# Patient Record
Sex: MALE | Race: WHITE | Employment: OTHER | URBAN - NONMETROPOLITAN AREA
[De-identification: names, ages, dates, MRNs, and addresses within clinical notes are randomized per-mention and may not be internally consistent; named-entity substitution may affect disease eponyms.]

---

## 2017-02-03 ENCOUNTER — OFFICE VISIT (OUTPATIENT)
Dept: UROLOGY | Age: 62
End: 2017-02-03

## 2017-02-03 VITALS
DIASTOLIC BLOOD PRESSURE: 80 MMHG | SYSTOLIC BLOOD PRESSURE: 112 MMHG | HEIGHT: 72 IN | BODY MASS INDEX: 24.79 KG/M2 | WEIGHT: 183 LBS

## 2017-02-03 DIAGNOSIS — N48.89 PENILE PAIN: Primary | ICD-10-CM

## 2017-02-03 DIAGNOSIS — R35.0 URINARY FREQUENCY: ICD-10-CM

## 2017-02-03 DIAGNOSIS — N41.0 ACUTE PROSTATITIS: ICD-10-CM

## 2017-02-03 LAB
BILIRUBIN URINE: NEGATIVE
BLOOD URINE, POC: NEGATIVE
CHARACTER, URINE: CLEAR
COLOR, URINE: YELLOW
GLUCOSE URINE: NEGATIVE MG/DL
KETONES, URINE: NEGATIVE
LEUKOCYTE CLUMPS, URINE: NEGATIVE
NITRITE, URINE: NEGATIVE
PH, URINE: 6
POST VOID RESIDUAL (PVR): 22 ML
PROTEIN, URINE: NEGATIVE MG/DL
SPECIFIC GRAVITY, URINE: 1.01 (ref 1–1.03)
UROBILINOGEN, URINE: 0.2 EU/DL

## 2017-02-03 PROCEDURE — 99203 OFFICE O/P NEW LOW 30 MIN: CPT | Performed by: NURSE PRACTITIONER

## 2017-02-03 PROCEDURE — 51798 US URINE CAPACITY MEASURE: CPT | Performed by: NURSE PRACTITIONER

## 2017-02-03 PROCEDURE — 81003 URINALYSIS AUTO W/O SCOPE: CPT | Performed by: NURSE PRACTITIONER

## 2017-02-03 RX ORDER — PHENAZOPYRIDINE HYDROCHLORIDE 200 MG/1
200 TABLET, FILM COATED ORAL 3 TIMES DAILY PRN
COMMUNITY
End: 2017-05-16 | Stop reason: ALTCHOICE

## 2017-02-03 RX ORDER — CIPROFLOXACIN 500 MG/1
500 TABLET, FILM COATED ORAL 2 TIMES DAILY
Qty: 50 TABLET | Refills: 0 | Status: SHIPPED | OUTPATIENT
Start: 2017-02-03 | End: 2017-02-28

## 2017-02-03 RX ORDER — ASPIRIN 325 MG
325 TABLET ORAL DAILY
COMMUNITY
End: 2017-05-16

## 2017-05-16 ENCOUNTER — OFFICE VISIT (OUTPATIENT)
Age: 62
End: 2017-05-16

## 2017-05-16 ENCOUNTER — TELEPHONE (OUTPATIENT)
Age: 62
End: 2017-05-16

## 2017-05-16 VITALS
RESPIRATION RATE: 16 BRPM | TEMPERATURE: 97 F | SYSTOLIC BLOOD PRESSURE: 110 MMHG | BODY MASS INDEX: 25.06 KG/M2 | DIASTOLIC BLOOD PRESSURE: 64 MMHG | HEIGHT: 72 IN | HEART RATE: 74 BPM | OXYGEN SATURATION: 96 % | WEIGHT: 185 LBS

## 2017-05-16 DIAGNOSIS — R10.30 LOWER ABDOMINAL PAIN: ICD-10-CM

## 2017-05-16 DIAGNOSIS — K21.9 GASTROESOPHAGEAL REFLUX DISEASE, ESOPHAGITIS PRESENCE NOT SPECIFIED: ICD-10-CM

## 2017-05-16 DIAGNOSIS — R19.4 CHANGE IN BOWEL HABIT: Primary | ICD-10-CM

## 2017-05-16 PROCEDURE — 99214 OFFICE O/P EST MOD 30 MIN: CPT | Performed by: SURGERY

## 2017-05-16 RX ORDER — ALPRAZOLAM 1 MG/1
0.5 TABLET ORAL NIGHTLY PRN
Qty: 30 TABLET | Refills: 0 | Status: SHIPPED | OUTPATIENT
Start: 2017-05-16 | End: 2017-08-01 | Stop reason: SDUPTHER

## 2017-05-16 RX ORDER — HYDROCODONE BITARTRATE AND ACETAMINOPHEN 5; 325 MG/1; MG/1
1 TABLET ORAL EVERY 6 HOURS PRN
Qty: 10 TABLET | Refills: 0 | Status: SHIPPED | OUTPATIENT
Start: 2017-05-16 | End: 2017-08-01 | Stop reason: SDUPTHER

## 2017-05-16 ASSESSMENT — ENCOUNTER SYMPTOMS
CHOKING: 0
VOICE CHANGE: 0
SORE THROAT: 0
EYE DISCHARGE: 0
RHINORRHEA: 0
DIARRHEA: 1
SHORTNESS OF BREATH: 0
CONSTIPATION: 1
COUGH: 0
BACK PAIN: 0
ANAL BLEEDING: 0
ABDOMINAL DISTENTION: 0
TROUBLE SWALLOWING: 0
BLOOD IN STOOL: 0
COLOR CHANGE: 0
EYE PAIN: 0
SINUS PRESSURE: 0
WHEEZING: 0
FACIAL SWELLING: 0
RECTAL PAIN: 0
EYE REDNESS: 0
PHOTOPHOBIA: 0
CHEST TIGHTNESS: 0
APNEA: 0
NAUSEA: 0
STRIDOR: 0
EYE ITCHING: 0
VOMITING: 0
ABDOMINAL PAIN: 0

## 2017-06-07 ENCOUNTER — TELEPHONE (OUTPATIENT)
Age: 62
End: 2017-06-07

## 2017-06-14 ENCOUNTER — TELEPHONE (OUTPATIENT)
Age: 62
End: 2017-06-14

## 2017-06-27 ENCOUNTER — TELEPHONE (OUTPATIENT)
Age: 62
End: 2017-06-27

## 2017-07-20 ENCOUNTER — HOSPITAL ENCOUNTER (OUTPATIENT)
Age: 62
Setting detail: OUTPATIENT SURGERY
Discharge: HOME | End: 2017-07-20
Attending: SURGERY | Admitting: SURGERY

## 2017-07-20 VITALS
BODY MASS INDEX: 23.7 KG/M2 | SYSTOLIC BLOOD PRESSURE: 118 MMHG | DIASTOLIC BLOOD PRESSURE: 80 MMHG | OXYGEN SATURATION: 97 % | TEMPERATURE: 97.2 F | HEIGHT: 72 IN | HEART RATE: 51 BPM | WEIGHT: 175 LBS | RESPIRATION RATE: 18 BRPM

## 2017-07-20 PROCEDURE — 3609017100 HC EGD: Performed by: SURGERY

## 2017-07-20 PROCEDURE — 43239 EGD BIOPSY SINGLE/MULTIPLE: CPT | Performed by: SURGERY

## 2017-07-20 PROCEDURE — 6360000002 HC RX W HCPCS

## 2017-07-20 PROCEDURE — 3609027000 HC COLONOSCOPY: Performed by: SURGERY

## 2017-07-20 PROCEDURE — 2580000003 HC RX 258: Performed by: SURGERY

## 2017-07-20 PROCEDURE — 7100000001 HC PACU RECOVERY - ADDTL 15 MIN: Performed by: SURGERY

## 2017-07-20 PROCEDURE — 7100000000 HC PACU RECOVERY - FIRST 15 MIN: Performed by: SURGERY

## 2017-07-20 PROCEDURE — 45378 DIAGNOSTIC COLONOSCOPY: CPT | Performed by: SURGERY

## 2017-07-20 PROCEDURE — 6360000002 HC RX W HCPCS: Performed by: SURGERY

## 2017-07-20 RX ORDER — SODIUM CHLORIDE 450 MG/100ML
INJECTION, SOLUTION INTRAVENOUS CONTINUOUS
Status: DISCONTINUED | OUTPATIENT
Start: 2017-07-20 | End: 2017-07-20 | Stop reason: HOSPADM

## 2017-07-20 RX ORDER — MIDAZOLAM HYDROCHLORIDE 1 MG/ML
INJECTION INTRAMUSCULAR; INTRAVENOUS PRN
Status: DISCONTINUED | OUTPATIENT
Start: 2017-07-20 | End: 2017-07-20 | Stop reason: HOSPADM

## 2017-07-20 RX ORDER — FENTANYL CITRATE 50 UG/ML
INJECTION, SOLUTION INTRAMUSCULAR; INTRAVENOUS PRN
Status: DISCONTINUED | OUTPATIENT
Start: 2017-07-20 | End: 2017-07-20 | Stop reason: HOSPADM

## 2017-07-20 RX ADMIN — SODIUM CHLORIDE: 4.5 INJECTION, SOLUTION INTRAVENOUS at 07:54

## 2017-07-20 ASSESSMENT — PAIN SCALES - GENERAL
PAINLEVEL_OUTOF10: 0
PAINLEVEL_OUTOF10: 0

## 2017-07-20 ASSESSMENT — PAIN - FUNCTIONAL ASSESSMENT: PAIN_FUNCTIONAL_ASSESSMENT: 0-10

## 2017-07-24 ENCOUNTER — TELEPHONE (OUTPATIENT)
Dept: SURGERY | Age: 62
End: 2017-07-24

## 2017-08-01 ENCOUNTER — OFFICE VISIT (OUTPATIENT)
Dept: SURGERY | Age: 62
End: 2017-08-01

## 2017-08-01 VITALS
TEMPERATURE: 97.2 F | SYSTOLIC BLOOD PRESSURE: 112 MMHG | DIASTOLIC BLOOD PRESSURE: 68 MMHG | OXYGEN SATURATION: 96 % | HEIGHT: 72 IN | WEIGHT: 178.7 LBS | RESPIRATION RATE: 18 BRPM | BODY MASS INDEX: 24.2 KG/M2 | HEART RATE: 90 BPM

## 2017-08-01 DIAGNOSIS — Z98.890 S/P COLONOSCOPY: ICD-10-CM

## 2017-08-01 DIAGNOSIS — Z98.890 S/P ENDOSCOPY: Primary | ICD-10-CM

## 2017-08-01 PROCEDURE — 99212 OFFICE O/P EST SF 10 MIN: CPT | Performed by: SURGERY

## 2017-08-01 ASSESSMENT — ENCOUNTER SYMPTOMS
ABDOMINAL DISTENTION: 0
ANAL BLEEDING: 0
ABDOMINAL PAIN: 0
SINUS PRESSURE: 0
DIARRHEA: 0
APNEA: 0
COLOR CHANGE: 0
TROUBLE SWALLOWING: 0
COUGH: 0
PHOTOPHOBIA: 0
CONSTIPATION: 1
BLOOD IN STOOL: 0
EYE DISCHARGE: 0
EYE ITCHING: 0
BACK PAIN: 0
SORE THROAT: 0
RHINORRHEA: 0
SHORTNESS OF BREATH: 0
EYE REDNESS: 0
RECTAL PAIN: 0
VOMITING: 0
FACIAL SWELLING: 0
CHOKING: 0
EYE PAIN: 0
STRIDOR: 0
VOICE CHANGE: 0
WHEEZING: 0
CHEST TIGHTNESS: 0
NAUSEA: 0

## 2017-08-01 NOTE — PROGRESS NOTES
Neurological: Negative for dizziness, tremors, seizures, syncope, facial asymmetry, speech difficulty, weakness, light-headedness, numbness and headaches. Hematological: Negative for adenopathy. Does not bruise/bleed easily. Psychiatric/Behavioral: Negative for agitation, behavioral problems, confusion, decreased concentration, dysphoric mood, hallucinations, self-injury, sleep disturbance and suicidal ideas. The patient is not nervous/anxious and is not hyperactive. Blood pressure 112/68, pulse 90, temperature 97.2 °F (36.2 °C), temperature source Tympanic, resp. rate 18, height 6' 0.05\" (1.83 m), weight 178 lb 11.2 oz (81.1 kg), SpO2 96 %. Body mass index is 24.2 kg/(m^2). Past Medical History:   Diagnosis Date    CKD (chronic kidney disease) stage 3, GFR 30-59 ml/min 12/23/2015    GERD (gastroesophageal reflux disease)     Hypertension     IBS (irritable bowel syndrome)      Past Surgical History:   Procedure Laterality Date    ARM SURGERY Left 1998    CARDIOVASCULAR STRESS TEST  2011    COLONOSCOPY  over 20 years ago    [place] COL[place] SCRN NOT  [address] IND N/A 7/20/2017    COLONOSCOPY performed by [address], MD at [address] [facility]    [place] COL[place] SCRN [place]  [address] IND Left 7/20/2017    COLONOSCOPY performed by [address], MD at [address] [facility]    [place] ESOPHAGOGASTRODUODENOSCOPY TRANSORAL DIAGNOSTIC N/A 7/20/2017    EGD  performed by [address], MD at [address] [facility]     Social History     Social History    Marital status:      Spouse name: N/A    Number of children: N/A    Years of education: N/A     Occupational History    Not on file.      Social History Main Topics    Smoking status: Former Smoker     Packs/day: 3.00     Years: 11.00     Quit date: 11/11/1973    Smokeless tobacco: Never Used    Alcohol use No    Drug use: No    Sexual activity: Not on file     Other Topics Concern    Not on file     Social History Narrative     Family History   Problem Relation Age of Onset    Heart Disease Mother     Cancer Paternal Aunt      colon     Allergies   Allergen Reactions    Iv Contrast [Iodides] Other (See Comments)     Pt states \"increase heart rate\"       Current Outpatient Prescriptions:     Probiotic Product (PROBIOTIC DAILY PO), Take 1 capsule by mouth daily, Disp: , Rfl:     doxazosin (CARDURA) 2 MG tablet, TAKE ONE TABLET BY MOUTH ONCE DAILY, Disp: 30 tablet, Rfl: 5    HYDROcodone-acetaminophen (NORCO) 5-325 MG per tablet, Take 1 tablet by mouth every 6 hours as needed for Pain ., Disp: 15 tablet, Rfl: 0    ALPRAZolam (XANAX) 0.5 MG tablet, Take 0.5 tablets by mouth 3 times daily as needed for Anxiety, Disp: 45 tablet, Rfl: 2    esomeprazole (NEXIUM) 20 MG delayed release capsule, Take 20 mg by mouth every morning (before breakfast), Disp: , Rfl:     gabapentin (NEURONTIN) 100 MG capsule, 3 in am; 3 in afternoon; 2 at bedtime, Disp: 240 capsule, Rfl: 5    triamcinolone (ARISTOCORT) 0.5 % cream, Apply topically 3 times daily, Disp: 1 Tube, Rfl: 2    finasteride (PROSCAR) 5 MG tablet, Take 5 mg by mouth daily, Disp: , Rfl:     hydrocortisone valerate (WESTCORT) 0.2 % ointment, Apply topically 2 times daily PRN, Disp: 1 Tube, Rfl: 5    Objective:   Physical Exam abdomen is soft bowel sounds positive    Assessment:      Discussed endoscopies with the patient including the normal colonoscopy we did discuss the fact that he has diverticular disease and we discussed diverticulosis scuffs to the current recommendation that no real dietary modification easily done just because of the diverticular disease we did discuss the hiatal hernia but the lack of esophagitis      Plan:      Return to office for repeat colonoscopy in 10 years and sooner if symptoms would ensue continue his Nexium

## 2017-08-06 ENCOUNTER — APPOINTMENT (OUTPATIENT)
Dept: GENERAL RADIOLOGY | Age: 62
End: 2017-08-06

## 2017-08-06 ENCOUNTER — HOSPITAL ENCOUNTER (EMERGENCY)
Age: 62
Discharge: HOME | End: 2017-08-07
Attending: EMERGENCY MEDICINE

## 2017-08-06 VITALS
TEMPERATURE: 97.7 F | WEIGHT: 175 LBS | HEART RATE: 57 BPM | BODY MASS INDEX: 23.7 KG/M2 | DIASTOLIC BLOOD PRESSURE: 73 MMHG | OXYGEN SATURATION: 100 % | SYSTOLIC BLOOD PRESSURE: 106 MMHG | RESPIRATION RATE: 16 BRPM | HEIGHT: 72 IN

## 2017-08-06 DIAGNOSIS — I49.3 VENTRICULAR ECTOPY: ICD-10-CM

## 2017-08-06 DIAGNOSIS — R00.2 PALPITATIONS: Primary | ICD-10-CM

## 2017-08-06 LAB
ALBUMIN SERPL-MCNC: 4.3 G/DL (ref 3.5–5.1)
ALP BLD-CCNC: 96 U/L (ref 38–126)
ALT SERPL-CCNC: 19 U/L (ref 11–66)
ANION GAP SERPL CALCULATED.3IONS-SCNC: 11 MEQ/L (ref 8–16)
AST SERPL-CCNC: 17 U/L (ref 5–40)
BASOPHILS # BLD: 0.7 %
BASOPHILS ABSOLUTE: 0 THOU/MM3 (ref 0–0.1)
BILIRUB SERPL-MCNC: 0.2 MG/DL (ref 0.3–1.2)
BUN BLDV-MCNC: 16 MG/DL (ref 7–22)
CALCIUM SERPL-MCNC: 9.2 MG/DL (ref 8.5–10.5)
CHLORIDE BLD-SCNC: 99 MEQ/L (ref 98–111)
CO2: 29 MEQ/L (ref 23–33)
CREAT SERPL-MCNC: 1.5 MG/DL (ref 0.4–1.2)
EOSINOPHIL # BLD: 4.2 %
EOSINOPHILS ABSOLUTE: 0.2 THOU/MM3 (ref 0–0.4)
GFR SERPL CREATININE-BSD FRML MDRD: 47 ML/MIN/1.73M2
GLUCOSE BLD-MCNC: 126 MG/DL (ref 70–108)
HCT VFR BLD CALC: 44.6 % (ref 42–52)
HEMOGLOBIN: 15.1 GM/DL (ref 14–18)
LYMPHOCYTES # BLD: 40.5 %
LYMPHOCYTES ABSOLUTE: 2.1 THOU/MM3 (ref 1–4.8)
MAGNESIUM: 2.2 MG/DL (ref 1.6–2.4)
MCH RBC QN AUTO: 30.9 PG (ref 27–31)
MCHC RBC AUTO-ENTMCNC: 33.8 GM/DL (ref 33–37)
MCV RBC AUTO: 91.3 FL (ref 80–94)
MONOCYTES # BLD: 9.7 %
MONOCYTES ABSOLUTE: 0.5 THOU/MM3 (ref 0.4–1.3)
NUCLEATED RED BLOOD CELLS: 0 /100 WBC
OSMOLALITY CALCULATION: 280.3 MOSMOL/KG (ref 275–300)
PDW BLD-RTO: 13.3 % (ref 11.5–14.5)
PLATELET # BLD: 218 THOU/MM3 (ref 130–400)
PMV BLD AUTO: 9.1 MCM (ref 7.4–10.4)
POTASSIUM SERPL-SCNC: 4.2 MEQ/L (ref 3.5–5.2)
RBC # BLD: 4.88 MILL/MM3 (ref 4.7–6.1)
RBC # BLD: NORMAL 10*6/UL
SEG NEUTROPHILS: 44.9 %
SEGMENTED NEUTROPHILS ABSOLUTE COUNT: 2.4 THOU/MM3 (ref 1.8–7.7)
SODIUM BLD-SCNC: 139 MEQ/L (ref 135–145)
TOTAL PROTEIN: 6.9 G/DL (ref 6.1–8)
TROPONIN T: < 0.01 NG/ML
TSH SERPL DL<=0.05 MIU/L-ACNC: 3.09 UIU/ML (ref 0.4–4.2)
WBC # BLD: 5.3 THOU/MM3 (ref 4.8–10.8)

## 2017-08-06 PROCEDURE — 80050 GENERAL HEALTH PANEL: CPT

## 2017-08-06 PROCEDURE — 36415 COLL VENOUS BLD VENIPUNCTURE: CPT

## 2017-08-06 PROCEDURE — 93005 ELECTROCARDIOGRAM TRACING: CPT

## 2017-08-06 PROCEDURE — 83735 ASSAY OF MAGNESIUM: CPT

## 2017-08-06 PROCEDURE — 71010 XR CHEST PORTABLE: CPT

## 2017-08-06 PROCEDURE — 84484 ASSAY OF TROPONIN QUANT: CPT

## 2017-08-06 PROCEDURE — 99285 EMERGENCY DEPT VISIT HI MDM: CPT

## 2017-08-06 ASSESSMENT — PAIN DESCRIPTION - LOCATION: LOCATION: HEAD

## 2017-08-06 ASSESSMENT — PAIN DESCRIPTION - PAIN TYPE: TYPE: ACUTE PAIN

## 2017-08-06 ASSESSMENT — ENCOUNTER SYMPTOMS
VOMITING: 0
BACK PAIN: 0
ABDOMINAL PAIN: 0
WHEEZING: 0
BLOOD IN STOOL: 0
DIARRHEA: 0
RHINORRHEA: 0
SHORTNESS OF BREATH: 0
NAUSEA: 0
COUGH: 0
SORE THROAT: 0

## 2017-08-06 ASSESSMENT — PAIN SCALES - GENERAL: PAINLEVEL_OUTOF10: 2

## 2017-08-06 ASSESSMENT — PAIN DESCRIPTION - ONSET: ONSET: GRADUAL

## 2017-08-06 ASSESSMENT — PAIN DESCRIPTION - DESCRIPTORS: DESCRIPTORS: ACHING

## 2017-08-07 LAB
EKG ATRIAL RATE: 61 BPM
EKG P AXIS: 35 DEGREES
EKG P-R INTERVAL: 150 MS
EKG Q-T INTERVAL: 406 MS
EKG QRS DURATION: 84 MS
EKG QTC CALCULATION (BAZETT): 408 MS
EKG R AXIS: 60 DEGREES
EKG T AXIS: 34 DEGREES
EKG VENTRICULAR RATE: 61 BPM

## 2017-08-23 DIAGNOSIS — R19.4 CHANGE IN BOWEL HABIT: ICD-10-CM

## 2017-08-23 DIAGNOSIS — R10.30 LOWER ABDOMINAL PAIN: ICD-10-CM

## 2017-08-23 DIAGNOSIS — K21.9 GASTROESOPHAGEAL REFLUX DISEASE, ESOPHAGITIS PRESENCE NOT SPECIFIED: ICD-10-CM

## 2017-08-24 ENCOUNTER — HOSPITAL ENCOUNTER (EMERGENCY)
Age: 62
Discharge: HOME | End: 2017-08-24
Attending: EMERGENCY MEDICINE

## 2017-08-24 ENCOUNTER — APPOINTMENT (OUTPATIENT)
Dept: GENERAL RADIOLOGY | Age: 62
End: 2017-08-24

## 2017-08-24 VITALS
HEIGHT: 72 IN | TEMPERATURE: 97.9 F | HEART RATE: 53 BPM | WEIGHT: 175 LBS | RESPIRATION RATE: 16 BRPM | OXYGEN SATURATION: 100 % | DIASTOLIC BLOOD PRESSURE: 72 MMHG | BODY MASS INDEX: 23.7 KG/M2 | SYSTOLIC BLOOD PRESSURE: 110 MMHG

## 2017-08-24 DIAGNOSIS — R00.1 BRADYCARDIA: Primary | ICD-10-CM

## 2017-08-24 DIAGNOSIS — R00.2 PALPITATIONS: ICD-10-CM

## 2017-08-24 LAB
ALBUMIN SERPL-MCNC: 4.1 G/DL (ref 3.5–5.1)
ALP BLD-CCNC: 82 U/L (ref 38–126)
ALT SERPL-CCNC: 13 U/L (ref 11–66)
ANION GAP SERPL CALCULATED.3IONS-SCNC: 11 MEQ/L (ref 8–16)
AST SERPL-CCNC: 13 U/L (ref 5–40)
BASOPHILS # BLD: 0.8 %
BASOPHILS ABSOLUTE: 0 THOU/MM3 (ref 0–0.1)
BILIRUB SERPL-MCNC: 0.4 MG/DL (ref 0.3–1.2)
BILIRUBIN DIRECT: < 0.2 MG/DL (ref 0–0.3)
BUN BLDV-MCNC: 17 MG/DL (ref 7–22)
CALCIUM SERPL-MCNC: 9.5 MG/DL (ref 8.5–10.5)
CHLORIDE BLD-SCNC: 102 MEQ/L (ref 98–111)
CO2: 29 MEQ/L (ref 23–33)
CREAT SERPL-MCNC: 1.4 MG/DL (ref 0.4–1.2)
EKG ATRIAL RATE: 48 BPM
EKG P AXIS: 42 DEGREES
EKG P-R INTERVAL: 154 MS
EKG Q-T INTERVAL: 420 MS
EKG QRS DURATION: 86 MS
EKG QTC CALCULATION (BAZETT): 375 MS
EKG R AXIS: 60 DEGREES
EKG T AXIS: 54 DEGREES
EKG VENTRICULAR RATE: 48 BPM
EOSINOPHIL # BLD: 3.8 %
EOSINOPHILS ABSOLUTE: 0.2 THOU/MM3 (ref 0–0.4)
GFR SERPL CREATININE-BSD FRML MDRD: 51 ML/MIN/1.73M2
GLUCOSE BLD-MCNC: 95 MG/DL (ref 70–108)
HCT VFR BLD CALC: 48 % (ref 42–52)
HEMOGLOBIN: 16.6 GM/DL (ref 14–18)
LIPASE: 56.6 U/L (ref 5.6–51.3)
LYMPHOCYTES # BLD: 41.1 %
LYMPHOCYTES ABSOLUTE: 2.3 THOU/MM3 (ref 1–4.8)
MAGNESIUM: 2.4 MG/DL (ref 1.6–2.4)
MCH RBC QN AUTO: 31.7 PG (ref 27–31)
MCHC RBC AUTO-ENTMCNC: 34.6 GM/DL (ref 33–37)
MCV RBC AUTO: 91.7 FL (ref 80–94)
MONOCYTES # BLD: 8.2 %
MONOCYTES ABSOLUTE: 0.5 THOU/MM3 (ref 0.4–1.3)
NUCLEATED RED BLOOD CELLS: 0 /100 WBC
OSMOLALITY CALCULATION: 284.5 MOSMOL/KG (ref 275–300)
PDW BLD-RTO: 12.8 % (ref 11.5–14.5)
PLATELET # BLD: 225 THOU/MM3 (ref 130–400)
PMV BLD AUTO: 9.6 MCM (ref 7.4–10.4)
POTASSIUM SERPL-SCNC: 4.3 MEQ/L (ref 3.5–5.2)
PRO-BNP: 82.4 PG/ML (ref 0–900)
RBC # BLD: 5.24 MILL/MM3 (ref 4.7–6.1)
RBC # BLD: NORMAL 10*6/UL
SEG NEUTROPHILS: 46.1 %
SEGMENTED NEUTROPHILS ABSOLUTE COUNT: 2.5 THOU/MM3 (ref 1.8–7.7)
SODIUM BLD-SCNC: 142 MEQ/L (ref 135–145)
TOTAL PROTEIN: 6.4 G/DL (ref 6.1–8)
TROPONIN T: < 0.01 NG/ML
WBC # BLD: 5.5 THOU/MM3 (ref 4.8–10.8)

## 2017-08-24 PROCEDURE — 80053 COMPREHEN METABOLIC PANEL: CPT

## 2017-08-24 PROCEDURE — 99285 EMERGENCY DEPT VISIT HI MDM: CPT

## 2017-08-24 PROCEDURE — 83690 ASSAY OF LIPASE: CPT

## 2017-08-24 PROCEDURE — 82248 BILIRUBIN DIRECT: CPT

## 2017-08-24 PROCEDURE — 93005 ELECTROCARDIOGRAM TRACING: CPT

## 2017-08-24 PROCEDURE — 36415 COLL VENOUS BLD VENIPUNCTURE: CPT

## 2017-08-24 PROCEDURE — 85025 COMPLETE CBC W/AUTO DIFF WBC: CPT

## 2017-08-24 PROCEDURE — 83880 ASSAY OF NATRIURETIC PEPTIDE: CPT

## 2017-08-24 PROCEDURE — 83735 ASSAY OF MAGNESIUM: CPT

## 2017-08-24 PROCEDURE — 84484 ASSAY OF TROPONIN QUANT: CPT

## 2017-08-24 PROCEDURE — 71020 XR CHEST STANDARD TWO VW: CPT

## 2017-08-24 ASSESSMENT — ENCOUNTER SYMPTOMS
CONSTIPATION: 0
SORE THROAT: 0
VOMITING: 0
COUGH: 0
CHEST TIGHTNESS: 0
SINUS PRESSURE: 0
EYE DISCHARGE: 0
SHORTNESS OF BREATH: 0
CHOKING: 0
PHOTOPHOBIA: 0
TROUBLE SWALLOWING: 0
RHINORRHEA: 0
BACK PAIN: 0
WHEEZING: 0
ABDOMINAL PAIN: 0
VOICE CHANGE: 0
EYE REDNESS: 0
EYE PAIN: 0
ABDOMINAL DISTENTION: 0
NAUSEA: 0
BLOOD IN STOOL: 0
DIARRHEA: 0
EYE ITCHING: 0

## 2017-09-22 ENCOUNTER — HOSPITAL ENCOUNTER (EMERGENCY)
Age: 62
Discharge: LEFT BEFORE DISCHARGE | End: 2017-09-22

## 2017-09-22 VITALS
WEIGHT: 175 LBS | TEMPERATURE: 98.5 F | HEART RATE: 60 BPM | BODY MASS INDEX: 23.7 KG/M2 | HEIGHT: 72 IN | DIASTOLIC BLOOD PRESSURE: 65 MMHG | RESPIRATION RATE: 16 BRPM | SYSTOLIC BLOOD PRESSURE: 102 MMHG | OXYGEN SATURATION: 96 %

## 2017-09-22 DIAGNOSIS — R00.2 PALPITATIONS: Primary | ICD-10-CM

## 2017-09-22 PROCEDURE — 93005 ELECTROCARDIOGRAM TRACING: CPT | Performed by: STUDENT IN AN ORGANIZED HEALTH CARE EDUCATION/TRAINING PROGRAM

## 2017-09-22 PROCEDURE — 99285 EMERGENCY DEPT VISIT HI MDM: CPT

## 2017-09-24 LAB
EKG ATRIAL RATE: 57 BPM
EKG P AXIS: 43 DEGREES
EKG P-R INTERVAL: 146 MS
EKG Q-T INTERVAL: 404 MS
EKG QRS DURATION: 86 MS
EKG QTC CALCULATION (BAZETT): 393 MS
EKG R AXIS: 64 DEGREES
EKG T AXIS: 56 DEGREES
EKG VENTRICULAR RATE: 57 BPM

## 2017-10-25 ENCOUNTER — HOSPITAL ENCOUNTER (EMERGENCY)
Age: 62
Discharge: HOME | End: 2017-10-25
Attending: EMERGENCY MEDICINE

## 2017-10-25 ENCOUNTER — APPOINTMENT (OUTPATIENT)
Dept: GENERAL RADIOLOGY | Age: 62
End: 2017-10-25

## 2017-10-25 VITALS
WEIGHT: 175 LBS | SYSTOLIC BLOOD PRESSURE: 113 MMHG | TEMPERATURE: 97.9 F | OXYGEN SATURATION: 98 % | RESPIRATION RATE: 16 BRPM | HEIGHT: 72 IN | BODY MASS INDEX: 23.7 KG/M2 | HEART RATE: 51 BPM | DIASTOLIC BLOOD PRESSURE: 78 MMHG

## 2017-10-25 DIAGNOSIS — R00.2 PALPITATIONS: Primary | ICD-10-CM

## 2017-10-25 DIAGNOSIS — I49.3 PVC (PREMATURE VENTRICULAR CONTRACTION): ICD-10-CM

## 2017-10-25 LAB
ANION GAP SERPL CALCULATED.3IONS-SCNC: 11 MEQ/L (ref 8–16)
BASOPHILS # BLD: 0.9 %
BASOPHILS ABSOLUTE: 0.1 THOU/MM3 (ref 0–0.1)
BUN BLDV-MCNC: 23 MG/DL (ref 7–22)
CALCIUM SERPL-MCNC: 9.5 MG/DL (ref 8.5–10.5)
CHLORIDE BLD-SCNC: 102 MEQ/L (ref 98–111)
CO2: 30 MEQ/L (ref 23–33)
CREAT SERPL-MCNC: 1.6 MG/DL (ref 0.4–1.2)
EOSINOPHIL # BLD: 3.8 %
EOSINOPHILS ABSOLUTE: 0.2 THOU/MM3 (ref 0–0.4)
GFR SERPL CREATININE-BSD FRML MDRD: 44 ML/MIN/1.73M2
GLUCOSE BLD-MCNC: 115 MG/DL (ref 70–108)
HCT VFR BLD CALC: 48.3 % (ref 42–52)
HEMOGLOBIN: 16.5 GM/DL (ref 14–18)
LYMPHOCYTES # BLD: 36.9 %
LYMPHOCYTES ABSOLUTE: 2.2 THOU/MM3 (ref 1–4.8)
MCH RBC QN AUTO: 31.4 PG (ref 27–31)
MCHC RBC AUTO-ENTMCNC: 34.1 GM/DL (ref 33–37)
MCV RBC AUTO: 92.2 FL (ref 80–94)
MONOCYTES # BLD: 10.4 %
MONOCYTES ABSOLUTE: 0.6 THOU/MM3 (ref 0.4–1.3)
NUCLEATED RED BLOOD CELLS: 0 /100 WBC
OSMOLALITY CALCULATION: 289.6 MOSMOL/KG (ref 275–300)
PDW BLD-RTO: 12.9 % (ref 11.5–14.5)
PLATELET # BLD: 223 THOU/MM3 (ref 130–400)
PMV BLD AUTO: 9 MCM (ref 7.4–10.4)
POTASSIUM SERPL-SCNC: 4.8 MEQ/L (ref 3.5–5.2)
RBC # BLD: 5.24 MILL/MM3 (ref 4.7–6.1)
SEG NEUTROPHILS: 48 %
SEGMENTED NEUTROPHILS ABSOLUTE COUNT: 2.9 THOU/MM3 (ref 1.8–7.7)
SODIUM BLD-SCNC: 143 MEQ/L (ref 135–145)
WBC # BLD: 6 THOU/MM3 (ref 4.8–10.8)

## 2017-10-25 PROCEDURE — 6370000000 HC RX 637 (ALT 250 FOR IP)

## 2017-10-25 PROCEDURE — 85025 COMPLETE CBC W/AUTO DIFF WBC: CPT

## 2017-10-25 PROCEDURE — 93005 ELECTROCARDIOGRAM TRACING: CPT

## 2017-10-25 PROCEDURE — 36415 COLL VENOUS BLD VENIPUNCTURE: CPT

## 2017-10-25 PROCEDURE — 99285 EMERGENCY DEPT VISIT HI MDM: CPT

## 2017-10-25 PROCEDURE — 80048 BASIC METABOLIC PNL TOTAL CA: CPT

## 2017-10-25 PROCEDURE — 71020 XR CHEST STANDARD TWO VW: CPT

## 2017-10-25 RX ORDER — PROPRANOLOL HYDROCHLORIDE 10 MG/1
10 TABLET ORAL 2 TIMES DAILY
COMMUNITY

## 2017-10-25 RX ORDER — THIAMINE HYDROCHLORIDE 100 MG/ML
100 INJECTION, SOLUTION INTRAMUSCULAR; INTRAVENOUS DAILY
Status: DISCONTINUED | OUTPATIENT
Start: 2017-10-25 | End: 2017-10-25 | Stop reason: HOSPADM

## 2017-10-25 RX ORDER — THIAMINE MONONITRATE (VIT B1) 100 MG
TABLET ORAL
Status: COMPLETED
Start: 2017-10-25 | End: 2017-10-25

## 2017-10-25 RX ADMIN — Medication 100 MG: at 21:13

## 2017-10-25 NOTE — ED TRIAGE NOTES
Pt to ED with c/o palpitations and sob at times. States palpitations have been present for 2 months and pt has been put on propanolol and midrodrine by cardiologist. Pt does not take midodrine due to side effects. On arrival no respiratory distress noted. States he has been having episodes of dizziness for 3 weeks when sitting and standing at times. Denies dizziness on arrival. Denies pain.

## 2017-10-25 NOTE — ED NOTES
Pt resting quietly in room no needs expressed. Side rails up x2 with call light in reach. Will continue to monitor. Updated pt on poc  Pt informed that all ordered tests have resulted.        Nilam Rodriguez RN  10/25/17 6780

## 2017-10-26 LAB
EKG ATRIAL RATE: 55 BPM
EKG P AXIS: 26 DEGREES
EKG P-R INTERVAL: 156 MS
EKG Q-T INTERVAL: 394 MS
EKG QRS DURATION: 86 MS
EKG QTC CALCULATION (BAZETT): 376 MS
EKG R AXIS: 47 DEGREES
EKG T AXIS: 45 DEGREES
EKG VENTRICULAR RATE: 55 BPM

## 2017-10-27 NOTE — ED PROVIDER NOTES
status of his paternal aunt is unknown.    family history includes Cancer in his paternal aunt; Heart Disease in his mother. SOCIAL HISTORY      reports that he quit smoking about 43 years ago. He has a 33.00 pack-year smoking history. He has never used smokeless tobacco. He reports that he does not drink alcohol or use drugs. PHYSICAL EXAM     INITIAL VITALS:  height is 6' (1.829 m) and weight is 175 lb (79.4 kg). His oral temperature is 97.9 °F (36.6 °C). His blood pressure is 113/78 and his pulse is 51. His respiration is 16 and oxygen saturation is 98%. Physical Exam   Constitutional:  well-developed and well-nourished. HENT: Head: Normocephalic, atraumatic, Bilateral external ears normal, Oropharynx mosit, No oral exudates, Nose normal.   Eyes: PERRL, EOMI, Conjunctiva normal, No discharge. No scleral icterus  Neck: Normal range of motion, No tenderness, Supple  Lympatics: No lymphadenopathy. Cardiovascular: Low rate, regular rhythm, S1 normal and S2 normal.  Exam reveals no gallop. Pulmonary/Chest: Effort normal and breath sounds normal. No accessory muscle usage or stridor. No respiratory distress. no wheezes. has no rales. exhibits no tenderness. Abdominal: Soft. Bowel sounds are normal.  exhibits no distension. There is no tenderness. There is no rebound and no guarding. Extremities: No edema, no tenderness, no cyanosis, no clubbing. Musculoskeletal: Good range of motion in major joints is observed. No major deformities noted. Neurological: Alert and oriented ×3, normal motor function, normal sensory function, no focal deficits. GCS   Skin: Skin is warm, dry and intact. No rash noted. No erythema.    Psychiatric: Affect normal, judgment normal, mood normal.  DIFFERENTIAL DIAGNOSIS:       DIAGNOSTIC RESULTS     EKG: All EKG's are interpreted by the Emergency Department Physician who either signs or Co-signs this chart in the absence of a cardiologist.      RADIOLOGY: non-plain film images(s) such as CT, Ultrasound and MRI are read by the radiologist.  Plain radiographic images are visualized and preliminarily interpreted by the emergency physician unless otherwise stated below. LABS:   Labs Reviewed   CBC WITH AUTO DIFFERENTIAL - Abnormal; Notable for the following:        Result Value    MCH 31.4 (*)     All other components within normal limits   BASIC METABOLIC PANEL - Abnormal; Notable for the following:     Glucose 115 (*)     BUN 23 (*)     CREATININE 1.6 (*)     All other components within normal limits   GLOMERULAR FILTRATION RATE, ESTIMATED - Abnormal; Notable for the following:     Est, Glom Filt Rate 44 (*)     All other components within normal limits   ANION GAP   OSMOLALITY       EMERGENCY DEPARTMENT COURSE:   Vitals:    Vitals:    10/25/17 1743 10/1955   BP: (!) 137/90 113/78   Pulse: 58 51   Resp: 16 16   Temp: 97.9 °F (36.6 °C)    TempSrc: Oral    SpO2: 100% 98%   Weight:  175 lb (79.4 kg)   Height:  6' (1.829 m)         CRITICAL CARE:       CONSULTS:  None    PROCEDURES:  None    FINAL IMPRESSION      1. Palpitations    2. PVC (premature ventricular contraction)          DISPOSITION/PLAN   Decision To Discharge  Patient has chronic palpitations and bradycardia. Occasional PVCs noted on his EKG. Case discussed with Dr. Sabrina Gilliland group, wants patient to reduce Inderal to once a day. Patient is also instructed to follow-up with cardiology tomorrow morning. This was discussed with patient, his agreeable to outpatient follow-up for his symptoms.   PATIENT REFERRED TO:  Ally Mark DO  [address] [facility], [address]  [address] [facility] [phone]    Call on 10/26/2017  RE-CHECK AND FURTHER TESTING AS NEEDED      DISCHARGE MEDICATIONS:  Discharge Medication List as of 10/25/2017  9:12 PM          (Please note that portions of this note were completed with a voice recognition program.  Efforts were made to edit the dictations but occasionally words are mis-transcribed.)    Sheyla Lizama, DO             Sheyla Lizama, DO  10/26/17 0944

## 2017-11-17 ENCOUNTER — APPOINTMENT (OUTPATIENT)
Dept: CT IMAGING | Age: 62
End: 2017-11-17

## 2017-11-17 ENCOUNTER — HOSPITAL ENCOUNTER (EMERGENCY)
Age: 62
Discharge: HOME | End: 2017-11-17
Attending: FAMILY MEDICINE

## 2017-11-17 ENCOUNTER — APPOINTMENT (OUTPATIENT)
Dept: GENERAL RADIOLOGY | Age: 62
End: 2017-11-17

## 2017-11-17 VITALS
OXYGEN SATURATION: 98 % | DIASTOLIC BLOOD PRESSURE: 74 MMHG | HEIGHT: 72 IN | RESPIRATION RATE: 17 BRPM | WEIGHT: 180 LBS | TEMPERATURE: 97.8 F | BODY MASS INDEX: 24.38 KG/M2 | SYSTOLIC BLOOD PRESSURE: 123 MMHG | HEART RATE: 73 BPM

## 2017-11-17 DIAGNOSIS — K44.9 HIATAL HERNIA: ICD-10-CM

## 2017-11-17 DIAGNOSIS — R07.81 PLEURITIC CHEST PAIN: ICD-10-CM

## 2017-11-17 DIAGNOSIS — R07.81 RIB PAIN ON RIGHT SIDE: Primary | ICD-10-CM

## 2017-11-17 DIAGNOSIS — K21.9 GASTROESOPHAGEAL REFLUX DISEASE, ESOPHAGITIS PRESENCE NOT SPECIFIED: ICD-10-CM

## 2017-11-17 LAB
ALBUMIN SERPL-MCNC: 4.6 G/DL (ref 3.5–5.1)
ALP BLD-CCNC: 93 U/L (ref 38–126)
ALT SERPL-CCNC: 16 U/L (ref 11–66)
ANION GAP SERPL CALCULATED.3IONS-SCNC: 12 MEQ/L (ref 8–16)
AST SERPL-CCNC: 19 U/L (ref 5–40)
BASOPHILS # BLD: 0.8 %
BASOPHILS ABSOLUTE: 0 THOU/MM3 (ref 0–0.1)
BILIRUB SERPL-MCNC: 0.3 MG/DL (ref 0.3–1.2)
BILIRUBIN DIRECT: < 0.2 MG/DL (ref 0–0.3)
BUN BLDV-MCNC: 23 MG/DL (ref 7–22)
CALCIUM SERPL-MCNC: 9.5 MG/DL (ref 8.5–10.5)
CHLORIDE BLD-SCNC: 100 MEQ/L (ref 98–111)
CO2: 29 MEQ/L (ref 23–33)
CREAT SERPL-MCNC: 1.4 MG/DL (ref 0.4–1.2)
D-DIMER QUANTITATIVE: 776 NG/ML FEU (ref 0–500)
EOSINOPHIL # BLD: 3.7 %
EOSINOPHILS ABSOLUTE: 0.2 THOU/MM3 (ref 0–0.4)
GFR SERPL CREATININE-BSD FRML MDRD: 51 ML/MIN/1.73M2
GLUCOSE BLD-MCNC: 105 MG/DL (ref 70–108)
GROUP A STREP CULTURE, REFLEX: NEGATIVE
HCT VFR BLD CALC: 48.6 % (ref 42–52)
HEMOGLOBIN: 16.7 GM/DL (ref 14–18)
LIPASE: 73.7 U/L (ref 5.6–51.3)
LYMPHOCYTES # BLD: 36.7 %
LYMPHOCYTES ABSOLUTE: 2 THOU/MM3 (ref 1–4.8)
MAGNESIUM: 2.4 MG/DL (ref 1.6–2.4)
MCH RBC QN AUTO: 31.4 PG (ref 27–31)
MCHC RBC AUTO-ENTMCNC: 34.3 GM/DL (ref 33–37)
MCV RBC AUTO: 91.6 FL (ref 80–94)
MONOCYTES # BLD: 10.1 %
MONOCYTES ABSOLUTE: 0.6 THOU/MM3 (ref 0.4–1.3)
NUCLEATED RED BLOOD CELLS: 0 /100 WBC
OSMOLALITY CALCULATION: 285.3 MOSMOL/KG (ref 275–300)
PDW BLD-RTO: 12.8 % (ref 11.5–14.5)
PLATELET # BLD: 224 THOU/MM3 (ref 130–400)
PMV BLD AUTO: 9.2 MCM (ref 7.4–10.4)
POTASSIUM SERPL-SCNC: 4.5 MEQ/L (ref 3.5–5.2)
PRO-BNP: 79.7 PG/ML (ref 0–900)
PROCALCITONIN: 0.03 NG/ML (ref 0.01–0.09)
RBC # BLD: 5.3 MILL/MM3 (ref 4.7–6.1)
REFLEX THROAT C + S: NORMAL
SEG NEUTROPHILS: 48.7 %
SEGMENTED NEUTROPHILS ABSOLUTE COUNT: 2.7 THOU/MM3 (ref 1.8–7.7)
SODIUM BLD-SCNC: 141 MEQ/L (ref 135–145)
TOTAL PROTEIN: 7.2 G/DL (ref 6.1–8)
TROPONIN T: < 0.01 NG/ML
TSH SERPL DL<=0.05 MIU/L-ACNC: 2.57 UIU/ML (ref 0.4–4.2)
WBC # BLD: 5.5 THOU/MM3 (ref 4.8–10.8)

## 2017-11-17 PROCEDURE — 99284 EMERGENCY DEPT VISIT MOD MDM: CPT

## 2017-11-17 PROCEDURE — 85379 FIBRIN DEGRADATION QUANT: CPT

## 2017-11-17 PROCEDURE — 6360000004 HC RX CONTRAST MEDICATION: Performed by: FAMILY MEDICINE

## 2017-11-17 PROCEDURE — 80050 GENERAL HEALTH PANEL: CPT

## 2017-11-17 PROCEDURE — 84484 ASSAY OF TROPONIN QUANT: CPT

## 2017-11-17 PROCEDURE — 96375 TX/PRO/DX INJ NEW DRUG ADDON: CPT

## 2017-11-17 PROCEDURE — 96374 THER/PROPH/DIAG INJ IV PUSH: CPT

## 2017-11-17 PROCEDURE — 36415 COLL VENOUS BLD VENIPUNCTURE: CPT

## 2017-11-17 PROCEDURE — 83735 ASSAY OF MAGNESIUM: CPT

## 2017-11-17 PROCEDURE — 83880 ASSAY OF NATRIURETIC PEPTIDE: CPT

## 2017-11-17 PROCEDURE — 2580000003 HC RX 258: Performed by: FAMILY MEDICINE

## 2017-11-17 PROCEDURE — 93005 ELECTROCARDIOGRAM TRACING: CPT

## 2017-11-17 PROCEDURE — 82248 BILIRUBIN DIRECT: CPT

## 2017-11-17 PROCEDURE — 71275 CT ANGIOGRAPHY CHEST: CPT

## 2017-11-17 PROCEDURE — 6360000002 HC RX W HCPCS: Performed by: FAMILY MEDICINE

## 2017-11-17 PROCEDURE — 87070 CULTURE OTHR SPECIMN AEROBIC: CPT

## 2017-11-17 PROCEDURE — 87880 STREP A ASSAY W/OPTIC: CPT

## 2017-11-17 PROCEDURE — 84145 PROCALCITONIN (PCT): CPT

## 2017-11-17 PROCEDURE — 71020 XR CHEST STANDARD TWO VW: CPT

## 2017-11-17 PROCEDURE — 83690 ASSAY OF LIPASE: CPT

## 2017-11-17 RX ORDER — SODIUM CHLORIDE 9 MG/ML
INJECTION, SOLUTION INTRAVENOUS CONTINUOUS
Status: DISCONTINUED | OUTPATIENT
Start: 2017-11-17 | End: 2017-11-18 | Stop reason: HOSPADM

## 2017-11-17 RX ORDER — ASPIRIN 81 MG/1
324 TABLET, CHEWABLE ORAL ONCE
Status: DISCONTINUED | OUTPATIENT
Start: 2017-11-17 | End: 2017-11-18 | Stop reason: HOSPADM

## 2017-11-17 RX ORDER — DIPHENHYDRAMINE HYDROCHLORIDE 50 MG/ML
25 INJECTION INTRAMUSCULAR; INTRAVENOUS ONCE
Status: COMPLETED | OUTPATIENT
Start: 2017-11-17 | End: 2017-11-17

## 2017-11-17 RX ORDER — METHYLPREDNISOLONE SODIUM SUCCINATE 40 MG/ML
40 INJECTION, POWDER, LYOPHILIZED, FOR SOLUTION INTRAMUSCULAR; INTRAVENOUS ONCE
Status: COMPLETED | OUTPATIENT
Start: 2017-11-17 | End: 2017-11-17

## 2017-11-17 RX ORDER — 0.9 % SODIUM CHLORIDE 0.9 %
1000 INTRAVENOUS SOLUTION INTRAVENOUS ONCE
Status: COMPLETED | OUTPATIENT
Start: 2017-11-17 | End: 2017-11-17

## 2017-11-17 RX ORDER — NITROGLYCERIN 0.4 MG/1
0.4 TABLET SUBLINGUAL EVERY 5 MIN PRN
Status: DISCONTINUED | OUTPATIENT
Start: 2017-11-17 | End: 2017-11-18 | Stop reason: HOSPADM

## 2017-11-17 RX ADMIN — IOPAMIDOL 80 ML: 755 INJECTION, SOLUTION INTRAVENOUS at 19:53

## 2017-11-17 RX ADMIN — SODIUM CHLORIDE 1000 ML: 9 INJECTION, SOLUTION INTRAVENOUS at 18:32

## 2017-11-17 RX ADMIN — METHYLPREDNISOLONE SODIUM SUCCINATE 40 MG: 40 INJECTION, POWDER, FOR SOLUTION INTRAMUSCULAR; INTRAVENOUS at 18:32

## 2017-11-17 RX ADMIN — SODIUM CHLORIDE: 9 INJECTION, SOLUTION INTRAVENOUS at 20:35

## 2017-11-17 RX ADMIN — DIPHENHYDRAMINE HYDROCHLORIDE 25 MG: 50 INJECTION, SOLUTION INTRAMUSCULAR; INTRAVENOUS at 18:33

## 2017-11-17 ASSESSMENT — ENCOUNTER SYMPTOMS
WHEEZING: 0
DIARRHEA: 0
CONSTIPATION: 0
CHEST TIGHTNESS: 1
ABDOMINAL DISTENTION: 0
ABDOMINAL PAIN: 0
FACIAL SWELLING: 0
COUGH: 0
NAUSEA: 0
SINUS PAIN: 0
VOMITING: 0
STRIDOR: 0
CHOKING: 0
TROUBLE SWALLOWING: 0
BLOOD IN STOOL: 0
SORE THROAT: 1
BACK PAIN: 0
ANAL BLEEDING: 0
SHORTNESS OF BREATH: 0
SINUS PRESSURE: 0
VOICE CHANGE: 0

## 2017-11-17 ASSESSMENT — PAIN DESCRIPTION - FREQUENCY: FREQUENCY: INTERMITTENT

## 2017-11-17 ASSESSMENT — PAIN SCALES - GENERAL: PAINLEVEL_OUTOF10: 0

## 2017-11-17 ASSESSMENT — PAIN DESCRIPTION - PAIN TYPE: TYPE: ACUTE PAIN

## 2017-11-17 NOTE — ED PROVIDER NOTES
[facility]  eMERGENCY dEPARTMENT eNCOUnter          [address] [facility]       Chief Complaint   Patient presents with    Rib Pain     right side    Other     throat pain       Nurses Notes reviewed and I agree except as noted in the HPI. HISTORY OF PRESENT ILLNESS    Mariela Domingo is a 57 y.o. male who presents to the Emergency Department for the evaluation of right-sided rib pain associated with pleuritic chest pain and sore throat. Patient reports pain in his throat for the past 4 days associated with intermittent spasms. He states he feels a throbbing sensation without any difficulty or painful swallowing. He says these episodes last for several seconds and resolve on their own, 4/10 on pain scale. No exposure to strep or mono. Denies fevers, chills, night sweats or weight loss. Patient does have a history of GERD and hiatal hernia. Recent endoscopy and colonoscopy in July without acute findings, per patient. Denies any cough, congestion, runny nose, muscle aches. No exposure to flu. Patient did not get a flu shot. He reports heartburn and a burning sensation in the center of his chest worse with inspiration and a rib pain on the right side just inferior to his ribs. No injury or trauma. Patient reports pleuritic chest pain worse with inspiration, no alleviating factors. Denies any shortness of breath or difficulty breathing. Denies any back or abdominal pain. No history of MI, PE or pneumonia. Patient takes Nexium daily. No formal gastroenterology evaluation. The HPI was provided by the patient. REVIEW OF SYSTEMS     Review of Systems   Constitutional: Positive for appetite change. Negative for activity change, chills, diaphoresis, fatigue and fever. HENT: Positive for sore throat. Negative for congestion, drooling, ear pain, facial swelling, mouth sores, nosebleeds, postnasal drip, sinus pain, sinus pressure, trouble swallowing and voice change.     Eyes: Negative for visual disturbance. Respiratory: Positive for chest tightness. Negative for cough, choking, shortness of breath, wheezing and stridor. Cardiovascular: Positive for chest pain. Negative for palpitations and leg swelling. Gastrointestinal: Negative for abdominal distention, abdominal pain, anal bleeding, blood in stool, constipation, diarrhea, nausea and vomiting. Genitourinary: Positive for flank pain. Negative for decreased urine volume, difficulty urinating, discharge, dysuria, enuresis, frequency, genital sores, hematuria, penile pain, penile swelling, scrotal swelling, testicular pain and urgency. Musculoskeletal: Negative for back pain. Skin: Negative for rash. Neurological: Negative for dizziness, light-headedness, numbness and headaches. Psychiatric/Behavioral: Negative for sleep disturbance. PAST MEDICAL HISTORY    has a past medical history of CKD (chronic kidney disease) stage 3, GFR 30-59 ml/min; GERD (gastroesophageal reflux disease); Hypertension; and IBS (irritable bowel syndrome). SURGICAL HISTORY      has a past surgical history that includes cardiovascular stress test (2011); Arm Surgery (Left, 1998); Colonoscopy (over 20 years ago); esophagogastroduodenoscopy transoral diagnostic (N/A, 7/20/2017); colon ca scrn not hi rsk ind (N/A, 7/20/2017); and colon ca scrn not hi rsk ind (Left, 7/20/2017).     CURRENT MEDICATIONS       Discharge Medication List as of 11/17/2017  9:30 PM      CONTINUE these medications which have NOT CHANGED    Details   propranolol (INDERAL) 10 MG tablet Take 10 mg by mouth 2 times dailyHistorical Med      HYDROcodone-acetaminophen (NORCO) 5-325 MG per tablet Take 1 tablet by mouth every 6 hours as needed for Pain ., Disp-15 tablet, R-0      ALPRAZolam (XANAX) 0.5 MG tablet Take 0.5 tablets by mouth 3 times daily as needed for Anxiety, Disp-45 tablet, R-2      esomeprazole (NEXIUM) 20 MG delayed release capsule Take 20 mg by mouth every morning (before breakfast)      gabapentin (NEURONTIN) 100 MG capsule 3 in am; 3 in afternoon; 2 at bedtime, Disp-240 capsule, R-5      triamcinolone (ARISTOCORT) 0.5 % cream Apply topically 3 times daily, Topical, 3 TIMES DAILY Starting 2016, Until Discontinued, Disp-1 Tube, R-2, Normal      finasteride (PROSCAR) 5 MG tablet Take 5 mg by mouth daily      hydrocortisone valerate (WESTCORT) 0.2 % ointment Apply topically 2 times daily PRN, Disp-1 Tube, R-5, Normal             ALLERGIES     is allergic to iv contrast [iodides]. FAMILY HISTORY     indicated that his mother is . He indicated that his father is . He indicated that the status of his paternal aunt is unknown.    family history includes Cancer in his paternal aunt; Heart Disease in his mother. SOCIAL HISTORY      reports that he quit smoking about 44 years ago. He has a 33.00 pack-year smoking history. He has never used smokeless tobacco. He reports that he does not drink alcohol or use drugs. PHYSICAL EXAM     INITIAL VITALS:  height is 6' (1.829 m) and weight is 180 lb (81.6 kg). His oral temperature is 97.8 °F (36.6 °C). His blood pressure is 123/74 and his pulse is 73. His respiration is 17 and oxygen saturation is 98%. Physical Exam   Constitutional: He is oriented to person, place, and time. He appears well-developed and well-nourished. No distress. HENT:   Head: Normocephalic and atraumatic. Nose: Nose normal.   Mouth/Throat: Oropharynx is clear and moist. No oropharyngeal exudate. Eyes: Conjunctivae and EOM are normal. Pupils are equal, round, and reactive to light. Right eye exhibits no discharge. Left eye exhibits no discharge. No scleral icterus. Neck: Normal range of motion. Neck supple. No JVD present. No tracheal deviation present. No thyromegaly present. Cardiovascular: Normal rate, regular rhythm, normal heart sounds and intact distal pulses. Exam reveals no gallop. No murmur heard.   Pulmonary/Chest: Effort normal. No accessory muscle usage or stridor. No respiratory distress. He has no decreased breath sounds. He has no wheezes. He has no rhonchi. He has no rales. Chest wall is not dull to percussion. He exhibits no mass, no tenderness, no bony tenderness, no laceration, no crepitus, no edema, no deformity, no swelling and no retraction. Abdominal: Soft. Bowel sounds are normal. He exhibits no shifting dullness, no distension, no pulsatile liver, no fluid wave, no abdominal bruit, no ascites, no pulsatile midline mass and no mass. There is no hepatosplenomegaly. There is no tenderness. There is no rigidity, no rebound, no guarding, no CVA tenderness, no tenderness at McBurney's point and negative Schumacher's sign. Musculoskeletal: Normal range of motion. Lymphadenopathy:     He has no cervical adenopathy. Neurological: He is alert and oriented to person, place, and time. Coordination normal.   Skin: Skin is warm and dry. He is not diaphoretic. Psychiatric: He has a normal mood and affect. His behavior is normal.   Nursing note and vitals reviewed. DIFFERENTIAL DIAGNOSIS:   ACS, MI, rib fracture, rib contusion, pneumonia, pneumothorax, PE, dissectionstrep throat, allergies,    DIAGNOSTIC RESULTS     EKG: All EKG's are interpreted by the Emergency Department Physician who either signs or Co-signs this chart in the absence of a cardiologist.  EKG interpreted by Hermes Carlson MD:    KG shows normal sinus rhythm without acute changes. Ventricular rate 60 bpm, FL interval 150 ms, QRS duration 82 ms, QTc 412 ms. RADIOLOGY: non-plain film images(s) such as CT, Ultrasound and MRI are read by the radiologist.    CTA Chest W WO Contrast   Final Result   1. No pulmonary emboli are seen. 2. Minimal atelectatic or fibrotic scarring left lung base. **This report has been created using voice recognition software. It may contain minor errors which are inherent in voice recognition technology. ** Final report electronically signed by Dr. Patricia Beckham on 11/17/2017 8:23 PM      XR CHEST STANDARD (2 VW)   Final Result   STABLE CHRONIC FINDINGS; NO ACUTE CARDIOPULMONARY PROCESS. **This report has been created using voice recognition software. It may contain minor errors which are inherent in voice recognition technology. **            Final report electronically signed by Dr. Dinah Aguila on 11/17/2017 4:29 PM          LABS:   Labs Reviewed   CBC WITH AUTO DIFFERENTIAL - Abnormal; Notable for the following:        Result Value    MCH 31.4 (*)     All other components within normal limits   LIPASE - Abnormal; Notable for the following:     Lipase 73.7 (*)     All other components within normal limits   D-DIMER, QUANTITATIVE - Abnormal; Notable for the following:     D-Dimer, Quant 776.00 (*)     All other components within normal limits   COMPREHENSIVE METABOLIC PANEL - Abnormal; Notable for the following:     CREATININE 1.4 (*)     BUN 23 (*)     All other components within normal limits   GLOMERULAR FILTRATION RATE, ESTIMATED - Abnormal; Notable for the following:     Est, Glom Filt Rate 51 (*)     All other components within normal limits   THROAT CULTURE   HEPATIC FUNCTION PANEL   TROPONIN   GROUP A STREP, REFLEX   BRAIN NATRIURETIC PEPTIDE   MAGNESIUM   TSH WITHOUT REFLEX   PROCALCITONIN   ANION GAP   OSMOLALITY       EMERGENCY DEPARTMENT COURSE:   Vitals:    Vitals:    11/17/17 1824 11/17/17 1829 11/17/17 1940 11/17/17 2034   BP:  (!) 140/88 110/78 123/74   Pulse: 72 71 81 73   Resp:  16 17 17   Temp:       TempSrc:       SpO2:  100% 99% 98%   Weight:       Height:           MDM  Number of Diagnoses or Management Options  Gastroesophageal reflux disease, esophagitis presence not specified:   Hiatal hernia:   Pleuritic chest pain:   Rib pain on right side:   Diagnosis management comments: Patient presents for evaluation of multiple nonspecific complaints.  On presentation, patient no immediate distress. Vitals reviewed. Routine labs and imaging ordered. Rapid strep negative. No evidence of active infection. d-dimer is elevated, CT chest showed no evidence of PE. Chest x-ray showed no acute findings. Given patient has a history of chronic kidney disease, contacted radiology department and discussed. Imaging protocol. Dr. Li Roche recommended IV fluid hydration. Patient was also given Benadryl 25 mg Solu-Medrol 40 mg IV ×1 prior to imaging. He was encouraged to continue oral hydration, which she tolerated. Discussed findings and updated patient wife at bedside. I estimate there is LOW risk for PULMONARY EMBOLISM, ACUTE CORONARY SYNDROME, OR THORACIC AORTIC DISSECTION, thus I consider the discharge disposition reasonable. The patient and/or family and I have discussed the diagnosis and risks, and we agree with discharging home to follow-up with their primary doctor. We also discussed returning to the Emergency Department immediately if new or worsening symptoms occur. We have discussed the symptoms which are most concerning (e.g., bloody sputum, fever, worsening pain or shortness of breath, vomiting) that necessitate immediate return. Reassurance was provided and anticipatory guidance was discussed. Education materials were provided with patient instructions. Advised to follow up with PCP within one week or return if symptoms worsen or persist. Also advised patient to schedule follow-up appointment with gastroenterology for appropriate consultation and evaluation. Patient understands and is in agreement with plan of care. 4:00 PM: The patient was seen and evaluated. CRITICAL CARE:   None    CONSULTS:  None    PROCEDURES:  None     FINAL IMPRESSION      1. Rib pain on right side    2. Hiatal hernia    3. Gastroesophageal reflux disease, esophagitis presence not specified    4.  Pleuritic chest pain          DISPOSITION/PLAN   Discharge     PATIENT REFERRED TO:  VANESSA Marques [address]  [phone]    Schedule an appointment as soon as possible for a visit in 1 week      [facility] EMERGENCY DEPT  [address]  Go to   If symptoms worsen    Hilario Bhatt MD  [address] [place] [address].  [address]    Schedule an appointment as soon as possible for a visit in 1 week        DISCHARGE MEDICATIONS:  Discharge Medication List as of 11/17/2017  9:30 PM          (Please note that portions of this note were completed with a voice recognition program.  Efforts were made to edit the dictations but occasionally words are mis-transcribed.)    Renata Pizano MD 11/17/17 10:38 PM                            Renata Pizano MD  11/17/17 0549

## 2017-11-17 NOTE — ED TRIAGE NOTES
PT to room 10. Pt presents to ED with c/o an intermittent pain in the throat. Pt states he also gets this pain in his right side just below ribs. Pt is currently alert, oriented. Respirations easy, unlabored. Pt currently denies pain. Pt states pain sometimes increases when laying flat. Pt states symptoms began approx 4 days ago.

## 2017-11-18 LAB
EKG ATRIAL RATE: 68 BPM
EKG P AXIS: 28 DEGREES
EKG P-R INTERVAL: 158 MS
EKG Q-T INTERVAL: 388 MS
EKG QRS DURATION: 82 MS
EKG QTC CALCULATION (BAZETT): 412 MS
EKG R AXIS: 51 DEGREES
EKG T AXIS: 43 DEGREES
EKG VENTRICULAR RATE: 68 BPM

## 2017-11-18 NOTE — ED NOTES
In to assist pt with urinal. Wife at bedside. Pt instructed to press call light when finished.       Tena Love RN  11/17/17 5387

## 2017-11-19 LAB — THROAT/NOSE CULTURE: NORMAL

## 2018-01-14 ENCOUNTER — HOSPITAL ENCOUNTER (EMERGENCY)
Age: 63
Discharge: HOME | End: 2018-01-14
Attending: EMERGENCY MEDICINE
Payer: MEDICARE

## 2018-01-14 VITALS
BODY MASS INDEX: 25.06 KG/M2 | TEMPERATURE: 98.5 F | OXYGEN SATURATION: 99 % | SYSTOLIC BLOOD PRESSURE: 112 MMHG | WEIGHT: 185 LBS | HEIGHT: 72 IN | HEART RATE: 65 BPM | DIASTOLIC BLOOD PRESSURE: 72 MMHG | RESPIRATION RATE: 18 BRPM

## 2018-01-14 DIAGNOSIS — R00.2 PALPITATIONS: Primary | ICD-10-CM

## 2018-01-14 LAB
ANION GAP SERPL CALCULATED.3IONS-SCNC: 10 MEQ/L (ref 8–16)
BASOPHILS # BLD: 0.8 %
BASOPHILS ABSOLUTE: 0 THOU/MM3 (ref 0–0.1)
BUN BLDV-MCNC: 27 MG/DL (ref 7–22)
CALCIUM SERPL-MCNC: 9.5 MG/DL (ref 8.5–10.5)
CHLORIDE BLD-SCNC: 101 MEQ/L (ref 98–111)
CO2: 29 MEQ/L (ref 23–33)
CREAT SERPL-MCNC: 1.4 MG/DL (ref 0.4–1.2)
EKG ATRIAL RATE: 62 BPM
EKG P AXIS: 41 DEGREES
EKG P-R INTERVAL: 138 MS
EKG Q-T INTERVAL: 396 MS
EKG QRS DURATION: 86 MS
EKG QTC CALCULATION (BAZETT): 401 MS
EKG R AXIS: 63 DEGREES
EKG T AXIS: 53 DEGREES
EKG VENTRICULAR RATE: 62 BPM
EOSINOPHIL # BLD: 2.9 %
EOSINOPHILS ABSOLUTE: 0.1 THOU/MM3 (ref 0–0.4)
GFR SERPL CREATININE-BSD FRML MDRD: 51 ML/MIN/1.73M2
GLUCOSE BLD-MCNC: 147 MG/DL (ref 70–108)
HCT VFR BLD CALC: 45.3 % (ref 42–52)
HEMOGLOBIN: 15.7 GM/DL (ref 14–18)
LYMPHOCYTES # BLD: 31.9 %
LYMPHOCYTES ABSOLUTE: 1.6 THOU/MM3 (ref 1–4.8)
MCH RBC QN AUTO: 31.8 PG (ref 27–31)
MCHC RBC AUTO-ENTMCNC: 34.6 GM/DL (ref 33–37)
MCV RBC AUTO: 91.8 FL (ref 80–94)
MONOCYTES # BLD: 10.1 %
MONOCYTES ABSOLUTE: 0.5 THOU/MM3 (ref 0.4–1.3)
NUCLEATED RED BLOOD CELLS: 0 /100 WBC
OSMOLALITY CALCULATION: 287.2 MOSMOL/KG (ref 275–300)
PDW BLD-RTO: 12.9 % (ref 11.5–14.5)
PLATELET # BLD: 213 THOU/MM3 (ref 130–400)
PMV BLD AUTO: 9 MCM (ref 7.4–10.4)
POTASSIUM SERPL-SCNC: 4.2 MEQ/L (ref 3.5–5.2)
RBC # BLD: 4.93 MILL/MM3 (ref 4.7–6.1)
SEG NEUTROPHILS: 54.3 %
SEGMENTED NEUTROPHILS ABSOLUTE COUNT: 2.7 THOU/MM3 (ref 1.8–7.7)
SODIUM BLD-SCNC: 140 MEQ/L (ref 135–145)
TROPONIN T: < 0.01 NG/ML
WBC # BLD: 4.9 THOU/MM3 (ref 4.8–10.8)

## 2018-01-14 PROCEDURE — 36415 COLL VENOUS BLD VENIPUNCTURE: CPT

## 2018-01-14 PROCEDURE — 80048 BASIC METABOLIC PNL TOTAL CA: CPT

## 2018-01-14 PROCEDURE — 84484 ASSAY OF TROPONIN QUANT: CPT

## 2018-01-14 PROCEDURE — 93005 ELECTROCARDIOGRAM TRACING: CPT

## 2018-01-14 PROCEDURE — 85025 COMPLETE CBC W/AUTO DIFF WBC: CPT

## 2018-01-14 PROCEDURE — 99284 EMERGENCY DEPT VISIT MOD MDM: CPT

## 2018-01-14 RX ORDER — TRAZODONE HYDROCHLORIDE 100 MG/1
100 TABLET ORAL NIGHTLY
COMMUNITY
End: 2020-03-18

## 2018-01-14 NOTE — ED PROVIDER NOTES
esophagogastroduodenoscopy transoral diagnostic (N/A, 2017); colon ca scrn not hi rsk ind (N/A, 2017); and colon ca scrn not hi rsk ind (Left, 2017). CURRENT MEDICATIONS       Discharge Medication List as of 2018  3:48 PM      CONTINUE these medications which have NOT CHANGED    Details   traZODone (DESYREL) 100 MG tablet Take 100 mg by mouth nightlyHistorical Med      propranolol (INDERAL) 10 MG tablet Take 10 mg by mouth 2 times dailyHistorical Med      esomeprazole (NEXIUM) 20 MG delayed release capsule Take 20 mg by mouth every morning (before breakfast)      gabapentin (NEURONTIN) 100 MG capsule 3 in am; 3 in afternoon; 2 at bedtime, Disp-240 capsule, R-5      HYDROcodone-acetaminophen (NORCO) 5-325 MG per tablet Take 1 tablet by mouth every 6 hours as needed for Pain ., Disp-15 tablet, R-0      triamcinolone (ARISTOCORT) 0.5 % cream Apply topically 3 times daily, Topical, 3 TIMES DAILY Starting 2016, Until Discontinued, Disp-1 Tube, R-2, Normal      finasteride (PROSCAR) 5 MG tablet Take 5 mg by mouth daily      hydrocortisone valerate (WESTCORT) 0.2 % ointment Apply topically 2 times daily PRN, Disp-1 Tube, R-5, Normal             ALLERGIES     is allergic to iv contrast [iodides]. FAMILY HISTORY     indicated that his mother is . He indicated that his father is . He indicated that the status of his paternal aunt is unknown.    family history includes Cancer in his paternal aunt; Heart Disease in his mother. SOCIAL HISTORY      reports that he quit smoking about 44 years ago. He has a 33.00 pack-year smoking history. He has never used smokeless tobacco. He reports that he does not drink alcohol or use drugs. PHYSICAL EXAM     INITIAL VITALS:  height is 6' (1.829 m) and weight is 185 lb (83.9 kg). His oral temperature is 98.5 °F (36.9 °C). His blood pressure is 112/72 and his pulse is 65. His respiration is 18 and oxygen saturation is 99%.     Constitutional: Well appearing and non-toxic   Eyes:  Pupils are equal and reactive, extraocular muscles intact   HENT:  Atraumatic appearing  oropharynx moist, no pharyngeal exudates. Neck- normal range of motion, no tenderness, supple   Respiratory:  No wheezing, rhonchi or rales  Cardiovascular: regular, no murmur  GI:  Non tender, no rigidity, rebound or guarding  Musculoskeletal:  2/4 distal pulses, no pitting edema  Integument: warm and dry  Neurologic:  Alert & oriented x 3  Psychiatric:  Speech and behavior appropriate         DIAGNOSTIC RESULTS     EKG: All EKG's are interpreted by the Emergency Department Physician who either signs or Co-signs this chart in the absence of a cardiologist.   EKG interpreted by me showing sinus rhythm at a rate is 62, QRS of 86, QTc of 401, axis of 63. No ischemic changes. LABS:   Labs Reviewed   CBC WITH AUTO DIFFERENTIAL - Abnormal; Notable for the following:        Result Value    MCH 31.8 (*)     All other components within normal limits   BASIC METABOLIC PANEL - Abnormal; Notable for the following:     Glucose 147 (*)     BUN 27 (*)     CREATININE 1.4 (*)     All other components within normal limits   GLOMERULAR FILTRATION RATE, ESTIMATED - Abnormal; Notable for the following:     Est, Glom Filt Rate 51 (*)     All other components within normal limits   TROPONIN   ANION GAP   OSMOLALITY       EMERGENCY DEPARTMENT COURSE:   Vitals:    Vitals:    01/14/18 1313 01/14/18 1615   BP: (!) 145/75 112/72   Pulse: 65 65   Resp: 18 18   Temp: 98.5 °F (36.9 °C)    TempSrc: Oral    SpO2: 98% 99%   Weight: 185 lb (83.9 kg)    Height: 6' (1.829 m)       He has an occasional PAC or PVC on the monitor. There is no evidence of any dangerous dysrhythmia during the length of time he has stayed on the monitor. This is something that has been evaluated for him in the past and he can follow-up with his cardiologist in the office. He does not have any chest pain or syncope today.     CRITICAL CARE:

## 2019-10-23 ENCOUNTER — HOSPITAL ENCOUNTER (OUTPATIENT)
Age: 64
Setting detail: SPECIMEN
Discharge: HOME | End: 2019-10-23
Payer: MEDICARE

## 2019-10-25 LAB
CULTURE: ABNORMAL
DIRECT EXAM: ABNORMAL
DIRECT EXAM: ABNORMAL
Lab: ABNORMAL
SPECIMEN DESCRIPTION: ABNORMAL

## 2020-01-19 ENCOUNTER — HOSPITAL ENCOUNTER (EMERGENCY)
Age: 65
Discharge: HOME | End: 2020-01-19
Attending: EMERGENCY MEDICINE
Payer: MEDICARE

## 2020-01-19 VITALS
OXYGEN SATURATION: 98 % | TEMPERATURE: 99.2 F | HEART RATE: 99 BPM | DIASTOLIC BLOOD PRESSURE: 93 MMHG | RESPIRATION RATE: 16 BRPM | SYSTOLIC BLOOD PRESSURE: 142 MMHG

## 2020-01-19 LAB
ANION GAP SERPL CALCULATED.3IONS-SCNC: 15 MEQ/L (ref 8–16)
BASOPHILS # BLD: 0.6 %
BASOPHILS ABSOLUTE: 0 THOU/MM3 (ref 0–0.1)
BUN BLDV-MCNC: 19 MG/DL (ref 7–22)
CALCIUM SERPL-MCNC: 9.6 MG/DL (ref 8.5–10.5)
CHLORIDE BLD-SCNC: 100 MEQ/L (ref 98–111)
CO2: 25 MEQ/L (ref 23–33)
CREAT SERPL-MCNC: 1.7 MG/DL (ref 0.4–1.2)
EOSINOPHIL # BLD: 3.6 %
EOSINOPHILS ABSOLUTE: 0.2 THOU/MM3 (ref 0–0.4)
ERYTHROCYTE [DISTWIDTH] IN BLOOD BY AUTOMATED COUNT: 12.4 % (ref 11.5–14.5)
ERYTHROCYTE [DISTWIDTH] IN BLOOD BY AUTOMATED COUNT: 43 FL (ref 35–45)
FLU A ANTIGEN: NEGATIVE
FLU B ANTIGEN: POSITIVE
GFR SERPL CREATININE-BSD FRML MDRD: 40 ML/MIN/1.73M2
GLUCOSE BLD-MCNC: 128 MG/DL (ref 70–108)
HCT VFR BLD CALC: 51.1 % (ref 42–52)
HEMOGLOBIN: 17 GM/DL (ref 14–18)
IMMATURE GRANS (ABS): 0 THOU/MM3 (ref 0–0.07)
IMMATURE GRANULOCYTES: 0 %
LYMPHOCYTES # BLD: 16.3 %
LYMPHOCYTES ABSOLUTE: 1 THOU/MM3 (ref 1–4.8)
MCH RBC QN AUTO: 31.5 PG (ref 26–33)
MCHC RBC AUTO-ENTMCNC: 33.3 GM/DL (ref 32.2–35.5)
MCV RBC AUTO: 94.8 FL (ref 80–94)
MONOCYTES # BLD: 10.3 %
MONOCYTES ABSOLUTE: 0.7 THOU/MM3 (ref 0.4–1.3)
NUCLEATED RED BLOOD CELLS: 0 /100 WBC
OSMOLALITY CALCULATION: 283.3 MOSMOL/KG (ref 275–300)
PLATELET # BLD: 213 THOU/MM3 (ref 130–400)
PMV BLD AUTO: 10.1 FL (ref 9.4–12.4)
POTASSIUM SERPL-SCNC: 4.5 MEQ/L (ref 3.5–5.2)
RBC # BLD: 5.39 MILL/MM3 (ref 4.7–6.1)
SEG NEUTROPHILS: 69.2 %
SEGMENTED NEUTROPHILS ABSOLUTE COUNT: 4.4 THOU/MM3 (ref 1.8–7.7)
SODIUM BLD-SCNC: 140 MEQ/L (ref 135–145)
WBC # BLD: 6.4 THOU/MM3 (ref 4.8–10.8)

## 2020-01-19 PROCEDURE — 2580000003 HC RX 258: Performed by: EMERGENCY MEDICINE

## 2020-01-19 PROCEDURE — 80048 BASIC METABOLIC PNL TOTAL CA: CPT

## 2020-01-19 PROCEDURE — 96375 TX/PRO/DX INJ NEW DRUG ADDON: CPT

## 2020-01-19 PROCEDURE — 99283 EMERGENCY DEPT VISIT LOW MDM: CPT

## 2020-01-19 PROCEDURE — 6360000002 HC RX W HCPCS: Performed by: EMERGENCY MEDICINE

## 2020-01-19 PROCEDURE — 87804 INFLUENZA ASSAY W/OPTIC: CPT

## 2020-01-19 PROCEDURE — 96361 HYDRATE IV INFUSION ADD-ON: CPT

## 2020-01-19 PROCEDURE — 85025 COMPLETE CBC W/AUTO DIFF WBC: CPT

## 2020-01-19 PROCEDURE — 36415 COLL VENOUS BLD VENIPUNCTURE: CPT

## 2020-01-19 PROCEDURE — 96374 THER/PROPH/DIAG INJ IV PUSH: CPT

## 2020-01-19 RX ORDER — PROCHLORPERAZINE EDISYLATE 5 MG/ML
10 INJECTION INTRAMUSCULAR; INTRAVENOUS ONCE
Status: COMPLETED | OUTPATIENT
Start: 2020-01-19 | End: 2020-01-19

## 2020-01-19 RX ORDER — 0.9 % SODIUM CHLORIDE 0.9 %
1000 INTRAVENOUS SOLUTION INTRAVENOUS ONCE
Status: COMPLETED | OUTPATIENT
Start: 2020-01-19 | End: 2020-01-19

## 2020-01-19 RX ORDER — KETOROLAC TROMETHAMINE 30 MG/ML
15 INJECTION, SOLUTION INTRAMUSCULAR; INTRAVENOUS ONCE
Status: COMPLETED | OUTPATIENT
Start: 2020-01-19 | End: 2020-01-19

## 2020-01-19 RX ORDER — DIPHENHYDRAMINE HYDROCHLORIDE 50 MG/ML
25 INJECTION INTRAMUSCULAR; INTRAVENOUS ONCE
Status: COMPLETED | OUTPATIENT
Start: 2020-01-19 | End: 2020-01-19

## 2020-01-19 RX ORDER — ONDANSETRON 4 MG/1
4 TABLET, ORALLY DISINTEGRATING ORAL EVERY 8 HOURS PRN
Qty: 20 TABLET | Refills: 0 | Status: SHIPPED | OUTPATIENT
Start: 2020-01-19 | End: 2020-03-18

## 2020-01-19 RX ADMIN — PROCHLORPERAZINE EDISYLATE 10 MG: 5 INJECTION INTRAMUSCULAR; INTRAVENOUS at 00:56

## 2020-01-19 RX ADMIN — KETOROLAC TROMETHAMINE 15 MG: 30 INJECTION, SOLUTION INTRAMUSCULAR at 00:56

## 2020-01-19 RX ADMIN — SODIUM CHLORIDE 1000 ML: 9 INJECTION, SOLUTION INTRAVENOUS at 00:56

## 2020-01-19 RX ADMIN — DIPHENHYDRAMINE HYDROCHLORIDE 25 MG: 50 INJECTION INTRAMUSCULAR; INTRAVENOUS at 00:56

## 2020-01-19 ASSESSMENT — PAIN DESCRIPTION - LOCATION: LOCATION: HEAD

## 2020-01-19 ASSESSMENT — ENCOUNTER SYMPTOMS
VOMITING: 1
SORE THROAT: 0
BACK PAIN: 0
NAUSEA: 1
SHORTNESS OF BREATH: 0
SINUS PAIN: 1
COUGH: 0
DIARRHEA: 0
BLOOD IN STOOL: 0
ABDOMINAL PAIN: 0
WHEEZING: 0

## 2020-01-19 ASSESSMENT — PAIN DESCRIPTION - DESCRIPTORS: DESCRIPTORS: ACHING

## 2020-01-19 ASSESSMENT — PAIN SCALES - GENERAL: PAINLEVEL_OUTOF10: 7

## 2020-01-19 ASSESSMENT — PAIN DESCRIPTION - PAIN TYPE: TYPE: ACUTE PAIN

## 2020-01-19 NOTE — ED NOTES
Pt to rm 07 per intake c/o nausea and dry heaves for the last 3 hrs. States he was seen at an urgent care yesterday and was told he just has a virus. States that since he has hx of kidney disease he was concerned that he could become dehydrated despite not having any emesis to this point. Pt also c/o an all over, generalized headache and facial pain, tylenol not helping. Pt appears in o acute distress at this time, VSS, SO at bedside.       Josue Alvarez RN  01/19/20 2991

## 2020-01-19 NOTE — ED PROVIDER NOTES
disease) stage 3, GFR 30-59 ml/min (HCC), GERD (gastroesophageal reflux disease), Hypertension, and IBS (irritable bowel syndrome). SURGICAL HISTORY      has a past surgical history that includes cardiovascular stress test (); Arm Surgery (Left, ); Colonoscopy (over 20 years ago); pr esophagogastroduodenoscopy transoral diagnostic (N/A, 2017); pr colon ca scrn not hi rsk ind (N/A, 2017); and pr colon ca scrn not hi rsk ind (Left, 2017). CURRENT MEDICATIONS       Previous Medications    ESOMEPRAZOLE (NEXIUM) 20 MG DELAYED RELEASE CAPSULE    Take 20 mg by mouth every morning (before breakfast)    FINASTERIDE (PROSCAR) 5 MG TABLET    Take 5 mg by mouth daily    GABAPENTIN (NEURONTIN) 100 MG CAPSULE    3 in am; 3 in afternoon; 2 at bedtime    HYDROCODONE-ACETAMINOPHEN (NORCO) 5-325 MG PER TABLET    Take 1 tablet by mouth every 6 hours as needed for Pain . HYDROCORTISONE VALERATE (WESTCORT) 0.2 % OINTMENT    Apply topically 2 times daily PRN    PROPRANOLOL (INDERAL) 10 MG TABLET    Take 10 mg by mouth 2 times daily    TRAZODONE (DESYREL) 100 MG TABLET    Take 100 mg by mouth nightly    TRIAMCINOLONE (ARISTOCORT) 0.5 % CREAM    Apply topically 3 times daily       ALLERGIES     is allergic to iv contrast [iodides]. FAMILY HISTORY     He indicated that his mother is . He indicated that his father is . He indicated that the status of his paternal aunt is unknown.   family history includes Cancer in his paternal aunt; Heart Disease in his mother. SOCIAL HISTORY      reports that he quit smoking about 46 years ago. He has a 33.00 pack-year smoking history. He has never used smokeless tobacco. He reports that he does not drink alcohol or use drugs. PHYSICAL EXAM       ED Triage Vitals [20 0023]   BP Temp Temp Source Pulse Resp SpO2 Height Weight   (!) 142/93 99.2 °F (37.3 °C) Oral 97 16 98 % -- --      Physical Exam  Vitals signs and nursing note reviewed. Constitutional:       Appearance: He is well-developed. He is not ill-appearing, toxic-appearing or diaphoretic. HENT:      Head: Normocephalic. Right Ear: External ear normal. No drainage. Left Ear: External ear normal. No drainage. Nose: Nose normal.      Mouth/Throat:      Mouth: Mucous membranes are not dry and not cyanotic. Eyes:      General: No scleral icterus. Right eye: No discharge. Left eye: No discharge. Conjunctiva/sclera: Conjunctivae normal.   Neck:      Musculoskeletal: Normal range of motion and neck supple. Trachea: Trachea and phonation normal. No tracheal deviation. Cardiovascular:      Rate and Rhythm: Normal rate and regular rhythm. Pulses:           Radial pulses are 2+ on the right side. Heart sounds: Normal heart sounds. No murmur. No friction rub. No gallop. Pulmonary:      Effort: Pulmonary effort is normal. No respiratory distress. Breath sounds: Normal breath sounds. No stridor. No wheezing or rales. Chest:      Chest wall: No tenderness. Abdominal:      General: Bowel sounds are normal. There is no distension. Palpations: Abdomen is soft. There is no pulsatile mass. Tenderness: There is no tenderness. There is no guarding or rebound. Musculoskeletal: Normal range of motion. General: No tenderness (No calf pain or tenderness. No evidence of DVT). Skin:     General: Skin is warm and dry. Coloration: Skin is not pale. Findings: No erythema or rash (on exposed surfaced). Neurological:      Mental Status: He is alert and oriented to person, place, and time. GCS: GCS eye subscore is 4. GCS verbal subscore is 5. GCS motor subscore is 6. Motor: No tremor or seizure activity. Coordination: Coordination normal.   Psychiatric:         Speech: Speech normal.         Behavior: Behavior normal. Behavior is cooperative.        DIFFERENTIAL DIAGNOSIS:   Nausea vomiting diarrhea acute (BENADRYL) injection 25 mg    ketorolac (TORADOL) injection 15 mg    prochlorperazine (COMPAZINE) injection 10 mg    ondansetron (ZOFRAN ODT) 4 MG disintegrating tablet     Sig: Take 1 tablet by mouth every 8 hours as needed for Nausea     Dispense:  20 tablet     Refill:  0       Patient was seen and evaluated in emergency department. History and physical were completed. Diagnostic labs were obtained and reviewed. Workup demonstrates influenza as expected. Very mild dehydration with stable kidney function. Patient treated IV fluids and Toradol and Zofran and Benadryl and Compazine. Discussed options for further care and treatment. Patient comfortable with discharge. Discussed signs and symptoms of concern necessitating return for more urgent care. She left with good understanding instructions, satisfied and reassured. FINAL IMPRESSION      1. Non-intractable vomiting with nausea, unspecified vomiting type    2.  Viral syndrome          DISPOSITION/PLAN   DISPOSITION Decision To Discharge 01/19/2020 12:56:27 AM    PATIENT REFERRED TO:  MARILOU Abarca - [facility]  [address]  [phone]    Schedule an appointment as soon as possible for a visit in 5 days  For [facility]  [address]    Return If Symptoms Worsen      DISCHARGE MEDICATIONS:  New Prescriptions    ONDANSETRON (ZOFRAN ODT) 4 MG DISINTEGRATING TABLET    Take 1 tablet by mouth every 8 hours as needed for Nausea     Dr. Mynor Hearn M.D 1/19/20 1:00 AM            Mynor Hearn MD  01/19/20 0101

## 2020-03-18 ENCOUNTER — APPOINTMENT (OUTPATIENT)
Dept: CT IMAGING | Age: 65
End: 2020-03-18
Payer: MEDICARE

## 2020-03-18 ENCOUNTER — HOSPITAL ENCOUNTER (EMERGENCY)
Age: 65
Discharge: HOME | End: 2020-03-18
Attending: FAMILY MEDICINE
Payer: MEDICARE

## 2020-03-18 VITALS
TEMPERATURE: 98.1 F | BODY MASS INDEX: 25.06 KG/M2 | HEIGHT: 72 IN | DIASTOLIC BLOOD PRESSURE: 84 MMHG | OXYGEN SATURATION: 97 % | RESPIRATION RATE: 15 BRPM | SYSTOLIC BLOOD PRESSURE: 145 MMHG | HEART RATE: 64 BPM | WEIGHT: 185 LBS

## 2020-03-18 LAB
ALBUMIN SERPL-MCNC: 4.2 G/DL (ref 3.5–5.1)
ALP BLD-CCNC: 87 U/L (ref 38–126)
ALT SERPL-CCNC: 35 U/L (ref 11–66)
ANION GAP SERPL CALCULATED.3IONS-SCNC: 14 MEQ/L (ref 8–16)
AST SERPL-CCNC: 24 U/L (ref 5–40)
BASOPHILS # BLD: 0.6 %
BASOPHILS ABSOLUTE: 0 THOU/MM3 (ref 0–0.1)
BILIRUB SERPL-MCNC: 0.2 MG/DL (ref 0.3–1.2)
BILIRUBIN URINE: NEGATIVE
BLOOD, URINE: NEGATIVE
BUN BLDV-MCNC: 28 MG/DL (ref 7–22)
CALCIUM SERPL-MCNC: 9 MG/DL (ref 8.5–10.5)
CHARACTER, URINE: CLEAR
CHLORIDE BLD-SCNC: 100 MEQ/L (ref 98–111)
CO2: 27 MEQ/L (ref 23–33)
COLOR: YELLOW
CREAT SERPL-MCNC: 1.7 MG/DL (ref 0.4–1.2)
EOSINOPHIL # BLD: 5 %
EOSINOPHILS ABSOLUTE: 0.3 THOU/MM3 (ref 0–0.4)
ERYTHROCYTE [DISTWIDTH] IN BLOOD BY AUTOMATED COUNT: 12.4 % (ref 11.5–14.5)
ERYTHROCYTE [DISTWIDTH] IN BLOOD BY AUTOMATED COUNT: 42.8 FL (ref 35–45)
GFR SERPL CREATININE-BSD FRML MDRD: 40 ML/MIN/1.73M2
GLUCOSE BLD-MCNC: 109 MG/DL (ref 70–108)
GLUCOSE, URINE: NEGATIVE MG/DL
HCT VFR BLD CALC: 44.5 % (ref 42–52)
HEMOGLOBIN: 14.8 GM/DL (ref 14–18)
IMMATURE GRANS (ABS): 0.03 THOU/MM3 (ref 0–0.07)
IMMATURE GRANULOCYTES: 0.5 %
KETONES, URINE: NEGATIVE
LEUKOCYTE EST, POC: NEGATIVE
LYMPHOCYTES # BLD: 40.2 %
LYMPHOCYTES ABSOLUTE: 2.7 THOU/MM3 (ref 1–4.8)
MCH RBC QN AUTO: 31.8 PG (ref 26–33)
MCHC RBC AUTO-ENTMCNC: 33.3 GM/DL (ref 32.2–35.5)
MCV RBC AUTO: 95.5 FL (ref 80–94)
MONOCYTES # BLD: 11.3 %
MONOCYTES ABSOLUTE: 0.7 THOU/MM3 (ref 0.4–1.3)
NITRITE, URINE: NEGATIVE
NUCLEATED RED BLOOD CELLS: 0 /100 WBC
OSMOLALITY CALCULATION: 287.3 MOSMOL/KG (ref 275–300)
PH UA: 6.5 (ref 5–9)
PLATELET # BLD: 236 THOU/MM3 (ref 130–400)
PMV BLD AUTO: 10.2 FL (ref 9.4–12.4)
POTASSIUM REFLEX MAGNESIUM: 4.6 MEQ/L (ref 3.5–5.2)
PROTEIN UA: NEGATIVE MG/DL
RBC # BLD: 4.66 MILL/MM3 (ref 4.7–6.1)
SEG NEUTROPHILS: 42.4 %
SEGMENTED NEUTROPHILS ABSOLUTE COUNT: 2.8 THOU/MM3 (ref 1.8–7.7)
SODIUM BLD-SCNC: 141 MEQ/L (ref 135–145)
SPECIFIC GRAVITY UA: 1.02 (ref 1–1.03)
TOTAL PROTEIN: 6.5 G/DL (ref 6.1–8)
UROBILINOGEN, URINE: 1 EU/DL (ref 0–1)
WBC # BLD: 6.6 THOU/MM3 (ref 4.8–10.8)

## 2020-03-18 PROCEDURE — 80053 COMPREHEN METABOLIC PANEL: CPT

## 2020-03-18 PROCEDURE — 99283 EMERGENCY DEPT VISIT LOW MDM: CPT

## 2020-03-18 PROCEDURE — 74176 CT ABD & PELVIS W/O CONTRAST: CPT

## 2020-03-18 PROCEDURE — 85025 COMPLETE CBC W/AUTO DIFF WBC: CPT

## 2020-03-18 PROCEDURE — 81003 URINALYSIS AUTO W/O SCOPE: CPT

## 2020-03-18 PROCEDURE — 36415 COLL VENOUS BLD VENIPUNCTURE: CPT

## 2020-03-18 NOTE — ED PROVIDER NOTES
[facility] COMPLAINT   Chief Complaint   Patient presents with    Abdominal Pain     left lower        HPI   Rhoda Renteria is a 77 y.o. male who presents with fairly constant sharp lower abdominal pain, onset was one month ago, but it has gotten more intense. He might have drank a couple of berry smoothies last week. He has a hx of diverticulosis. The duration has been constant since the onset. The patient has no associated dysuria, urinary hesitancy, flank pain, penile discharge or fevers. He has lots of loose stools in recent weeks. No exacerbating or relieving factors noted. There are no alleviating factors. The context is that the symptoms started spontaneously, without any known precipitants. REVIEW OF SYSTEMS   GI: See history of present illness   Cardiac: No chest pain or syncope   Pulmonary: No difficulty breathing or new cough   General: No fevers   : No hematuria or dysuria   See HPI for further details. All other review of systems are reviewed and are otherwise negative.      PAST MEDICAL & SURGICAL HISTORY   Past Medical History:   Diagnosis Date    CKD (chronic kidney disease) stage 3, GFR 30-59 ml/min ([facility]) 12/23/2015    GERD (gastroesophageal reflux disease)     Hypertension     IBS (irritable bowel syndrome)       Past Surgical History:   Procedure Laterality Date    ARM SURGERY Left 1998    CARDIOVASCULAR STRESS TEST  2011    COLONOSCOPY  over 20 years ago    AK COLON CA SCRN NOT HI RSK IND N/A 7/20/2017    COLONOSCOPY performed by Chucky Mason MD at [address] [place] [address] CA SCRN NOT  [address] IND Left 7/20/2017    COLONOSCOPY performed by Chucky Mason MD at [facility]    AK ESOPHAGOGASTRODUODENOSCOPY TRANSORAL DIAGNOSTIC N/A 7/20/2017    [facility]  [facility]        CURRENT MEDICATIONS   Current Outpatient Rx   Medication Sig Dispense Refill    propranolol (INDERAL) 10 MG tablet Take 10 mg by mouth 2 times daily      esomeprazole (NEXIUM) 20 MG delayed release capsule Take 20 mg by mouth every morning (before breakfast)      gabapentin (NEURONTIN) 100 MG capsule 3 in am; 3 in afternoon; 2 at bedtime (Patient taking differently: 1 in am; 3 in afternoon; 3 at bedtime) 240 capsule 5    triamcinolone (ARISTOCORT) 0.5 % cream Apply topically 3 times daily 1 Tube 2    finasteride (PROSCAR) 5 MG tablet Take 5 mg by mouth daily      hydrocortisone valerate (WESTCORT) 0.2 % ointment Apply topically 2 times daily PRN 1 Tube 5        ALLERGIES   Allergies   Allergen Reactions    Iv Contrast [Iodides] Other (See Comments)     Pt states \"increase heart rate\"        SOCIAL AND FAMILY HISTORY   Social History     Socioeconomic History    Marital status:      Spouse name: None    Number of children: None    Years of education: None    Highest education level: None   Occupational History    None   Social Needs    Financial resource strain: None    Food insecurity     Worry: None     Inability: None    Transportation needs     Medical: None     Non-medical: None   Tobacco Use    Smoking status: Former Smoker     Packs/day: 3.00     Years: 11.00     Pack years: 33.00     Last attempt to quit: 1973     Years since quittin.3    Smokeless tobacco: Never Used   Substance and Sexual Activity    Alcohol use: No     Alcohol/week: 0.0 standard drinks    Drug use: No    Sexual activity: None   Lifestyle    Physical activity     Days per week: None     Minutes per session: None    Stress: None   Relationships    Social connections     Talks on phone: None     Gets together: None     Attends Confucianist service: None     Active member of club or organization: None     Attends meetings of clubs or organizations: None     Relationship status: None    Intimate partner violence     Fear of current or ex partner: None     Emotionally abused: None     Physically abused: None     Forced sexual activity: None   Other Topics Concern    None   Social History Narrative    None      Family History   Problem Relation Age of Onset    Heart Disease Mother     Cancer Paternal Aunt         colon        PHYSICAL EXAM   VITAL SIGNS: BP (!) 145/84   Pulse 64   Temp 98.1 °F (36.7 °C) (Oral)   Resp 15   Ht 6' (1.829 m)   Wt 185 lb (83.9 kg)   SpO2 97%   BMI 25.09 kg/m²    Constitutional: Well developed, well nourished, mild distress  Eyes: Sclera nonicteric, conjunctiva moist   HENT: Atraumatic, nose normal   Neck: Supple, no JVD   Respiratory: No retractions, no accessory muscle use, normal breath sounds   Cardiovascular: Normal rate, normal rhythm, no murmurs   GI: Soft, mild to moderate suprapubic abdominal tenderness, no guarding, bowel sounds present, no audible bruits or palpable pulsatile masses   Musculoskeletal: No edema, no deformity   Vascular: DP pulses 2+ equal bilaterally   Integument: No rash, dry skin   Neurologic: Alert & oriented, normal speech   Psychiatric: Cooperative, pleasant affect     RADIOLOGY/PROCEDURES   CT ABDOMEN PELVIS WO CONTRAST Additional Contrast? None   Final Result   No obstructing ureteral stone or hydronephrosis. Scattered stool in the colon with diverticulosis no evidence for diverticulitis. **This report has been created using voice recognition software. It may contain minor errors which are inherent in voice recognition technology. **      Final report electronically signed by Dr. Elvira Jordan on 3/18/2020 8:15 PM           LABS   Labs Reviewed   COMPREHENSIVE METABOLIC PANEL W/ REFLEX TO MG FOR LOW K - Abnormal; Notable for the following components:       Result Value    Glucose 109 (*)     CREATININE 1.7 (*)     BUN 28 (*)     Total Bilirubin 0.2 (*)     All other components within normal limits   CBC WITH AUTO DIFFERENTIAL - Abnormal; Notable for the following components:    RBC 4.66 (*)     MCV 95.5 (*)     All other components within normal limits

## 2020-07-05 ENCOUNTER — HOSPITAL ENCOUNTER (EMERGENCY)
Age: 65
Discharge: HOME | End: 2020-07-05
Attending: EMERGENCY MEDICINE
Payer: MEDICARE

## 2020-07-05 ENCOUNTER — APPOINTMENT (OUTPATIENT)
Dept: GENERAL RADIOLOGY | Age: 65
End: 2020-07-05
Payer: MEDICARE

## 2020-07-05 VITALS
OXYGEN SATURATION: 96 % | SYSTOLIC BLOOD PRESSURE: 133 MMHG | DIASTOLIC BLOOD PRESSURE: 79 MMHG | BODY MASS INDEX: 27.77 KG/M2 | WEIGHT: 205 LBS | TEMPERATURE: 98.1 F | HEIGHT: 72 IN | HEART RATE: 62 BPM | RESPIRATION RATE: 17 BRPM

## 2020-07-05 LAB
ANION GAP SERPL CALCULATED.3IONS-SCNC: 9 MEQ/L (ref 8–16)
BASOPHILS # BLD: 0.8 %
BASOPHILS ABSOLUTE: 0 THOU/MM3 (ref 0–0.1)
BUN BLDV-MCNC: 21 MG/DL (ref 7–22)
CALCIUM SERPL-MCNC: 9.2 MG/DL (ref 8.5–10.5)
CHLORIDE BLD-SCNC: 103 MEQ/L (ref 98–111)
CO2: 27 MEQ/L (ref 23–33)
CREAT SERPL-MCNC: 1.5 MG/DL (ref 0.4–1.2)
EKG ATRIAL RATE: 66 BPM
EKG P AXIS: 30 DEGREES
EKG P-R INTERVAL: 144 MS
EKG Q-T INTERVAL: 388 MS
EKG QRS DURATION: 84 MS
EKG QTC CALCULATION (BAZETT): 406 MS
EKG R AXIS: 48 DEGREES
EKG T AXIS: 52 DEGREES
EKG VENTRICULAR RATE: 66 BPM
EOSINOPHIL # BLD: 3.8 %
EOSINOPHILS ABSOLUTE: 0.2 THOU/MM3 (ref 0–0.4)
ERYTHROCYTE [DISTWIDTH] IN BLOOD BY AUTOMATED COUNT: 12.1 % (ref 11.5–14.5)
ERYTHROCYTE [DISTWIDTH] IN BLOOD BY AUTOMATED COUNT: 42.1 FL (ref 35–45)
GFR SERPL CREATININE-BSD FRML MDRD: 47 ML/MIN/1.73M2
GLUCOSE BLD-MCNC: 113 MG/DL (ref 70–108)
HCT VFR BLD CALC: 46.3 % (ref 42–52)
HEMOGLOBIN: 15.7 GM/DL (ref 14–18)
IMMATURE GRANS (ABS): 0.03 THOU/MM3 (ref 0–0.07)
IMMATURE GRANULOCYTES: 0.6 %
LYMPHOCYTES # BLD: 33.6 %
LYMPHOCYTES ABSOLUTE: 1.8 THOU/MM3 (ref 1–4.8)
MCH RBC QN AUTO: 32.2 PG (ref 26–33)
MCHC RBC AUTO-ENTMCNC: 33.9 GM/DL (ref 32.2–35.5)
MCV RBC AUTO: 94.9 FL (ref 80–94)
MONOCYTES # BLD: 9 %
MONOCYTES ABSOLUTE: 0.5 THOU/MM3 (ref 0.4–1.3)
NUCLEATED RED BLOOD CELLS: 0 /100 WBC
OSMOLALITY CALCULATION: 281.3 MOSMOL/KG (ref 275–300)
PLATELET # BLD: 231 THOU/MM3 (ref 130–400)
PMV BLD AUTO: 10.5 FL (ref 9.4–12.4)
POTASSIUM REFLEX MAGNESIUM: 4.5 MEQ/L (ref 3.5–5.2)
RBC # BLD: 4.88 MILL/MM3 (ref 4.7–6.1)
SEG NEUTROPHILS: 52.2 %
SEGMENTED NEUTROPHILS ABSOLUTE COUNT: 2.8 THOU/MM3 (ref 1.8–7.7)
SODIUM BLD-SCNC: 139 MEQ/L (ref 135–145)
TROPONIN T: < 0.01 NG/ML
TROPONIN T: < 0.01 NG/ML
WBC # BLD: 5.3 THOU/MM3 (ref 4.8–10.8)

## 2020-07-05 PROCEDURE — 85025 COMPLETE CBC W/AUTO DIFF WBC: CPT

## 2020-07-05 PROCEDURE — 84484 ASSAY OF TROPONIN QUANT: CPT

## 2020-07-05 PROCEDURE — 93005 ELECTROCARDIOGRAM TRACING: CPT | Performed by: EMERGENCY MEDICINE

## 2020-07-05 PROCEDURE — 71045 X-RAY EXAM CHEST 1 VIEW: CPT

## 2020-07-05 PROCEDURE — 99283 EMERGENCY DEPT VISIT LOW MDM: CPT

## 2020-07-05 PROCEDURE — 36415 COLL VENOUS BLD VENIPUNCTURE: CPT

## 2020-07-05 PROCEDURE — 80048 BASIC METABOLIC PNL TOTAL CA: CPT

## 2020-07-05 PROCEDURE — 93010 ELECTROCARDIOGRAM REPORT: CPT | Performed by: NUCLEAR MEDICINE

## 2020-07-05 NOTE — ED PROVIDER NOTES
[facility] EMERGENCY DEPT      CHIEF COMPLAINT       Chief Complaint   Patient presents with    Chest Pain     left       Nurses Notes reviewed and I agree except as noted in the HPI. HISTORY OF PRESENT ILLNESS    William Ariza is a 77 y.o. male who presents with complaint of chest pain, left chest wall, intermittent sharp pain, lasting between 30 seconds to 1 minute. Pain appears to come on while at rest.  He has no history of CAD, had a stress test about a year ago and passed it. And is currently chest pain-free. Onset: Acute  Duration: 4 hours ago  Timing: Intermittent  Location of Pain: Sharp left chest wall  Intesity/severity: Mild lasting about 30  Modifying Factors: Not apparent  Relieved by;  Previous Episodes; Tx Before arrival: None  REVIEW OF SYSTEMS      Review of Systems   Constitutional: Negative for fever, chills, diaphoresis and fatigue. HENT: Negative for congestion, drooling, facial swelling and sore throat. Eyes: Negative for photophobia, pain and discharge. Respiratory: Negative for cough, shortness of breath, wheezing and stridor. Cardiovascular: Positive for intermittent sharp chest pain; negative for palpitations and leg swelling. Gastrointestinal: Negative for abdominal pain, blood in stool and abdominal distention. Genitourinary: Negative for dysuria, urgency, hematuria and difficulty urinating. Musculoskeletal: Negative for gait problem, neck pain and neck stiffness. Skin; No rash, No itching  Neurological: Negative for seizures, weakness and numbness. Psychiatric/Behavioral: Negative for hallucinations, confusion and agitation. PAST MEDICAL HISTORY    has a past medical history of CKD (chronic kidney disease) stage 3, GFR 30-59 ml/min ([facility]), GERD (gastroesophageal reflux disease), Hypertension, and IBS (irritable bowel syndrome). SURGICAL HISTORY      has a past surgical history that includes cardiovascular stress test (2011);  Arm Surgery (Left, ); Colonoscopy (over 20 years ago); pr esophagogastroduodenoscopy transoral diagnostic (N/A, 2017); pr colon ca scrn not hi rsk ind (N/A, 2017); and pr colon ca scrn not hi rsk ind (Left, 2017). CURRENT MEDICATIONS       Discharge Medication List as of 2020  4:47 PM      CONTINUE these medications which have NOT CHANGED    Details   propranolol (INDERAL) 10 MG tablet Take 10 mg by mouth 2 times dailyHistorical Med      esomeprazole (NEXIUM) 20 MG delayed release capsule Take 20 mg by mouth every morning (before breakfast)      gabapentin (NEURONTIN) 100 MG capsule 3 in am; 3 in afternoon; 2 at bedtime, Disp-240 capsule, R-5      triamcinolone (ARISTOCORT) 0.5 % cream Apply topically 3 times daily, Topical, 3 TIMES DAILY Starting 2016, Until Discontinued, Disp-1 Tube, R-2, Normal      finasteride (PROSCAR) 5 MG tablet Take 5 mg by mouth daily      hydrocortisone valerate (WESTCORT) 0.2 % ointment Apply topically 2 times daily PRN, Disp-1 Tube, R-5, Normal             ALLERGIES     is allergic to iv contrast [iodides]. FAMILY HISTORY     He indicated that his mother is . He indicated that his father is . He indicated that the status of his paternal aunt is unknown.   family history includes Cancer in his paternal aunt; Heart Disease in his mother. SOCIAL HISTORY      reports that he quit smoking about 46 years ago. He has a 33.00 pack-year smoking history. He has never used smokeless tobacco. He reports that he does not drink alcohol or use drugs. PHYSICAL EXAM     INITIAL VITALS:  height is 6' (1.829 m) and weight is 205 lb (93 kg). His oral temperature is 98.1 °F (36.7 °C). His blood pressure is 133/79 and his pulse is 62. His respiration is 17 and oxygen saturation is 96%. Physical Exam   Constitutional:  well-developed and well-nourished.    HENT: Head: Normocephalic, atraumatic, Bilateral external ears normal, Oropharynx mosit, No oral exudates, Nose normal.   Eyes: PERRL, EOMI, Conjunctiva normal, No discharge. No scleral icterus  Neck: Normal range of motion, No tenderness, Supple  Cardiovascular: Normal rate, regular rhythm, S1 normal and S2 normal.  Exam reveals no gallop. Pulmonary/Chest: Effort normal and breath sounds normal. No accessory muscle usage or stridor. No respiratory distress. no wheezes. has no rales. exhibits no tenderness. Abdominal: Soft. Bowel sounds are normal.  exhibits no distension. There is no tenderness. There is no rebound and no guarding. Extremities: No edema, no tenderness, no cyanosis, no clubbing. Musculoskeletal: Good range of motion in major joints is observed. No major deformities noted. Neurological: Alert and oriented ×3, normal motor function, normal sensory function, no focal deficits. [id number]   Skin: Skin is warm, dry and intact. No rash noted. No erythema. Psychiatric: Affect normal, judgment normal, mood normal.  DIFFERENTIAL DIAGNOSIS:   ACS, chest wall pain, muscle spasms, costochondritis, PE, aortic dissection, pneumonia, pleurisy    DIAGNOSTIC RESULTS     EKG: All EKG's are interpreted by the Emergency Department Physician who either signs or Co-signs this chart in the absence of a cardiologist.      RADIOLOGY: non-plain film images(s) such as CT, Ultrasound and MRI are read by the radiologist.  Plain radiographic images are visualized and preliminarily interpreted by the emergency physician unless otherwise stated below.       LABS:   Labs Reviewed   CBC WITH AUTO DIFFERENTIAL - Abnormal; Notable for the following components:       Result Value    MCV 94.9 (*)     All other components within normal limits   BASIC METABOLIC PANEL W/ REFLEX TO MG FOR LOW K - Abnormal; Notable for the following components:    Glucose 113 (*)     CREATININE 1.5 (*)     All other components within normal limits   GLOMERULAR FILTRATION RATE, ESTIMATED - Abnormal; Notable for the following components:    Est, Glom Filt Rate 47 (*)     All other components within normal limits   TROPONIN   ANION GAP   OSMOLALITY   TROPONIN       EMERGENCY DEPARTMENT COURSE:   Vitals:    Vitals:    07/05/20 1352 07/05/20 1451 07/05/20 1554 07/05/20 1650   BP: (!) 134/92 (!) 142/81 (!) 152/91 133/79   Pulse: 63 57 60 62   Resp: 18 12 16 17   Temp:       TempSrc:       SpO2: 95% 98% 95% 96%   Weight:       Height:           Patient presenting with complaint of chest wall pain, intermittent sharp left exertion chest wall pain, currently chest pain-free, EKG is nonacute. CRITICAL CARE:       CONSULTS:  None    PROCEDURES:  None    FINAL IMPRESSION      1.  Other chest pain          DISPOSITION/PLAN   Decision To Discharge    PATIENT REFERRED TO:  Frank Yun MD  [place] [id number] [phone]    Call in 2 days  RE-CHECK AND FURTHER TESTING AS NEEDED      DISCHARGE MEDICATIONS:  Discharge Medication List as of 7/5/2020  4:47 PM          (Please note that portions of this note were completed with a voice recognition program.  Efforts were made to edit the dictations but occasionally words are mis-transcribed.)    Emi Guerrreo, [address] [place] [address],   07/05/20 1936

## 2020-07-05 NOTE — ED NOTES
Patient presents to the ED with complaints of left sided chest pain. He states that he was at [employer] when the pain happened. He denies having pain at this time, but states that when the pain happens it's about a 7/10.      Estephanie Cazares, ANGELAN  37/49/92 [id number]
Improved.

## 2020-09-23 ENCOUNTER — HOSPITAL ENCOUNTER (OUTPATIENT)
Age: 65
Setting detail: SPECIMEN
Discharge: HOME | End: 2020-09-23
Payer: MEDICARE

## 2020-09-23 LAB
ABSOLUTE EOS #: 0.03 K/UL (ref 0–0.44)
ABSOLUTE IMMATURE GRANULOCYTE: 0.04 K/UL (ref 0–0.3)
ABSOLUTE LYMPH #: 1.76 K/UL (ref 1.1–3.7)
ABSOLUTE MONO #: 1.15 K/UL (ref 0.1–1.2)
ALBUMIN SERPL-MCNC: 4.5 G/DL (ref 3.5–5.2)
ALBUMIN/GLOBULIN RATIO: 1.6 (ref 1–2.5)
ALP BLD-CCNC: 101 U/L (ref 40–129)
ALT SERPL-CCNC: 17 U/L (ref 5–41)
ANION GAP SERPL CALCULATED.3IONS-SCNC: 13 MMOL/L (ref 9–17)
AST SERPL-CCNC: 15 U/L
BASOPHILS # BLD: 0 % (ref 0–2)
BASOPHILS ABSOLUTE: 0.05 K/UL (ref 0–0.2)
BILIRUB SERPL-MCNC: 0.9 MG/DL (ref 0.3–1.2)
BUN BLDV-MCNC: 30 MG/DL (ref 8–23)
BUN/CREAT BLD: ABNORMAL (ref 9–20)
CALCIUM SERPL-MCNC: 10 MG/DL (ref 8.6–10.4)
CHLORIDE BLD-SCNC: 102 MMOL/L (ref 98–107)
CHOLESTEROL/HDL RATIO: 3.2
CHOLESTEROL: 156 MG/DL
CO2: 24 MMOL/L (ref 20–31)
CREAT SERPL-MCNC: 1.66 MG/DL (ref 0.7–1.2)
DIFFERENTIAL TYPE: ABNORMAL
EOSINOPHILS RELATIVE PERCENT: 0 % (ref 1–4)
GFR AFRICAN AMERICAN: 50 ML/MIN
GFR NON-AFRICAN AMERICAN: 42 ML/MIN
GFR SERPL CREATININE-BSD FRML MDRD: ABNORMAL ML/MIN/{1.73_M2}
GFR SERPL CREATININE-BSD FRML MDRD: ABNORMAL ML/MIN/{1.73_M2}
GLUCOSE BLD-MCNC: 115 MG/DL (ref 70–99)
HCT VFR BLD CALC: 49.7 % (ref 40.7–50.3)
HDLC SERPL-MCNC: 49 MG/DL
HEMOGLOBIN: 16.5 G/DL (ref 13–17)
IMMATURE GRANULOCYTES: 0 %
LDL CHOLESTEROL: 89 MG/DL (ref 0–130)
LYMPHOCYTES # BLD: 15 % (ref 24–43)
MCH RBC QN AUTO: 30.9 PG (ref 25.2–33.5)
MCHC RBC AUTO-ENTMCNC: 33.2 G/DL (ref 28.4–34.8)
MCV RBC AUTO: 93.1 FL (ref 82.6–102.9)
MONOCYTES # BLD: 10 % (ref 3–12)
NRBC AUTOMATED: 0 PER 100 WBC
PDW BLD-RTO: 12 % (ref 11.8–14.4)
PLATELET # BLD: 299 K/UL (ref 138–453)
PLATELET ESTIMATE: ABNORMAL
PMV BLD AUTO: 11.2 FL (ref 8.1–13.5)
POTASSIUM SERPL-SCNC: 4.1 MMOL/L (ref 3.7–5.3)
RBC # BLD: 5.34 M/UL (ref 4.21–5.77)
RBC # BLD: ABNORMAL 10*6/UL
SEG NEUTROPHILS: 75 % (ref 36–65)
SEGMENTED NEUTROPHILS ABSOLUTE COUNT: 8.8 K/UL (ref 1.5–8.1)
SODIUM BLD-SCNC: 139 MMOL/L (ref 135–144)
TOTAL PROTEIN: 7.4 G/DL (ref 6.4–8.3)
TRIGL SERPL-MCNC: 88 MG/DL
TSH SERPL DL<=0.05 MIU/L-ACNC: 0.92 MIU/L (ref 0.3–5)
URIC ACID: 7.4 MG/DL (ref 3.4–7)
VLDLC SERPL CALC-MCNC: NORMAL MG/DL (ref 1–30)
WBC # BLD: 11.8 K/UL (ref 3.5–11.3)
WBC # BLD: ABNORMAL 10*3/UL

## 2021-07-31 ENCOUNTER — HOSPITAL ENCOUNTER (OUTPATIENT)
Age: 66
Setting detail: SPECIMEN
Discharge: HOME | End: 2021-07-31
Payer: MEDICARE

## 2021-08-03 LAB — DERMATOLOGY PATHOLOGY REPORT: NORMAL

## 2022-04-29 ENCOUNTER — HOSPITAL ENCOUNTER (OUTPATIENT)
Dept: GENERAL RADIOLOGY | Age: 67
Discharge: HOME | End: 2022-04-29
Payer: MEDICARE

## 2022-04-29 ENCOUNTER — HOSPITAL ENCOUNTER (OUTPATIENT)
Age: 67
Discharge: HOME | End: 2022-04-29
Payer: MEDICARE

## 2022-04-29 DIAGNOSIS — D49.9 NEOPLASM: ICD-10-CM

## 2022-04-29 PROCEDURE — 71046 X-RAY EXAM CHEST 2 VIEWS: CPT

## 2022-05-09 ENCOUNTER — HOSPITAL ENCOUNTER (OUTPATIENT)
Age: 67
Setting detail: SPECIMEN
Discharge: HOME | End: 2022-05-09

## 2022-05-12 LAB — DERMATOLOGY PATHOLOGY REPORT: NORMAL

## 2022-08-11 ENCOUNTER — OFFICE VISIT (OUTPATIENT)
Dept: SURGERY | Age: 67
End: 2022-08-11
Payer: MEDICARE

## 2022-08-11 VITALS
SYSTOLIC BLOOD PRESSURE: 130 MMHG | BODY MASS INDEX: 26.95 KG/M2 | TEMPERATURE: 97.3 F | HEART RATE: 68 BPM | OXYGEN SATURATION: 97 % | WEIGHT: 199 LBS | HEIGHT: 72 IN | DIASTOLIC BLOOD PRESSURE: 80 MMHG

## 2022-08-11 DIAGNOSIS — Z12.11 ENCOUNTER FOR SCREENING COLONOSCOPY: ICD-10-CM

## 2022-08-11 DIAGNOSIS — R19.4 CHANGE IN BOWEL HABITS: Primary | ICD-10-CM

## 2022-08-11 PROCEDURE — 1036F TOBACCO NON-USER: CPT | Performed by: SURGERY

## 2022-08-11 PROCEDURE — 3017F COLORECTAL CA SCREEN DOC REV: CPT | Performed by: SURGERY

## 2022-08-11 PROCEDURE — G8417 CALC BMI ABV UP PARAM F/U: HCPCS | Performed by: SURGERY

## 2022-08-11 PROCEDURE — 1123F ACP DISCUSS/DSCN MKR DOCD: CPT | Performed by: SURGERY

## 2022-08-11 PROCEDURE — G8427 DOCREV CUR MEDS BY ELIG CLIN: HCPCS | Performed by: SURGERY

## 2022-08-11 PROCEDURE — 99213 OFFICE O/P EST LOW 20 MIN: CPT | Performed by: SURGERY

## 2022-08-11 RX ORDER — ATORVASTATIN CALCIUM 20 MG/1
20 TABLET, FILM COATED ORAL DAILY
COMMUNITY

## 2022-08-11 RX ORDER — BACILLUS COAGULANS/INULIN 1B-250 MG
CAPSULE ORAL
COMMUNITY

## 2022-08-11 ASSESSMENT — ENCOUNTER SYMPTOMS
TROUBLE SWALLOWING: 0
BACK PAIN: 0
EYE PAIN: 0
EYE ITCHING: 0
SINUS PAIN: 0
DIARRHEA: 1
EYES NEGATIVE: 1
COUGH: 0
FACIAL SWELLING: 0
RESPIRATORY NEGATIVE: 1
COLOR CHANGE: 0
VOICE CHANGE: 0
SORE THROAT: 0
RHINORRHEA: 0
PHOTOPHOBIA: 0
ALLERGIC/IMMUNOLOGIC NEGATIVE: 1
SHORTNESS OF BREATH: 0
CHEST TIGHTNESS: 0
APNEA: 0
CONSTIPATION: 1
WHEEZING: 0
CHOKING: 0
EYE DISCHARGE: 0
EYE REDNESS: 0
STRIDOR: 0
SINUS PRESSURE: 0

## 2022-08-11 NOTE — PROGRESS NOTES
[address] [facility] [address]  Dept: [phone]  Dept Fax: [phone]  Loc: [phone]    Visit Date: 2022    Anner Apgar is a 69 y.o. male who presentstoday for:  Chief Complaint   Patient presents with    Surgical Consult     Est patient-Needs colonoscopy-last one done 2017-issues with bloating, diarrhea, abdominal cramping       HPI:     HPI 68-year-old white male well-known to me who I have performed colonoscopies in the past with the last in 2017. At that time he had no polyps but did have diverticular disease we also performed an EGD. The patient has had a history of fairly classic irritable bowel syndrome is here today because he has had a change in the odor of his' flatus. Elise Red He still has fairly classic double bowel syndromes with irregular bowel habits. Then constipation. But has been on probiotics over the last 3 months. He mentions he has been eating the cinnamon roll small about 4 times a day and is questioning whether that could be changing the odor of the flatus. He describes the odor is somewhat sulfur and odor.   He denies any blood in his stool no pain at the anal opening have abdominal cramping again suggestive of irritable bowel    Past Medical History:   Diagnosis Date    Cancer ([address]     CKD (chronic kidney disease) stage 3, GFR 30-59 ml/min ([facility]) 2015    GERD (gastroesophageal reflux disease)     Hypertension     IBS (irritable bowel syndrome)       Past Surgical History:   Procedure Laterality Date    ARM SURGERY Left 1998    CARDIOVASCULAR STRESS TEST  2011    COLONOSCOPY  over 20 years ago    [facility] COLON CA SCRN NOT  [address] IND N/A 2017    COLONOSCOPY performed by Ankit Jackson MD at [address] [place] [address] CA SCRN NOT  [address] IND Left 2017    COLONOSCOPY performed by Ankit Jackson MD at [facility]    NH ESOPHAGOGASTRODUODENOSCOPY TRANSORAL DIAGNOSTIC N/A 2017    EGD  performed by Flora White MD at [address] Right 2022    removed from upper lip        Family History   Problem Relation Age of Onset    Heart Disease Mother     Cancer Paternal Aunt         colon        Social History     Tobacco Use    Smoking status: Former     Packs/day: 3.00     Years: 11.00     Pack years: 33.00     Types: Cigarettes     Quit date: 1973     Years since quittin.7    Smokeless tobacco: Never   Substance Use Topics    Alcohol use: No     Alcohol/week: 0.0 standard drinks          Current Outpatient Medications   Medication Sig Dispense Refill    Bacillus Coagulans-Inulin (PROBIOTIC) 1-250 BILLION-MG CAPS Take by mouth      atorvastatin (LIPITOR) 20 MG tablet Take 20 mg by mouth in the morning. propranolol (INDERAL) 10 MG tablet Take 10 mg by mouth 2 times daily      esomeprazole (NEXIUM) 20 MG delayed release capsule Take 20 mg by mouth every morning (before breakfast)      gabapentin (NEURONTIN) 100 MG capsule 3 in am; 3 in afternoon; 2 at bedtime (Patient taking differently: 1 in am; 3 in afternoon; 3 at bedtime) 240 capsule 5    triamcinolone (ARISTOCORT) 0.5 % cream Apply topically 3 times daily 1 Tube 2    finasteride (PROSCAR) 5 MG tablet Take 5 mg by mouth daily      hydrocortisone valerate (WESTCORT) 0.2 % ointment Apply topically 2 times daily PRN 1 Tube 5     No current facility-administered medications for this visit. Allergies   Allergen Reactions    Iv Contrast [Iodides] Other (See Comments)     Pt states \"increase heart rate\"       Subjective:      Review of Systems   Constitutional: Negative. Negative for activity change, appetite change, chills, diaphoresis, fatigue, fever and unexpected weight change. HENT: Negative.   Negative for congestion, dental problem, drooling, ear discharge, ear pain, facial swelling, hearing loss, mouth sores, nosebleeds, postnasal drip, rhinorrhea, sinus pressure, sinus pain, sneezing, sore throat, tinnitus, trouble swallowing and voice change. Eyes: Negative. Negative for photophobia, pain, discharge, redness, itching and visual disturbance. Respiratory: Negative. Negative for apnea, cough, choking, chest tightness, shortness of breath, wheezing and stridor. Cardiovascular: Negative. Negative for chest pain, palpitations and leg swelling. Gastrointestinal:  Positive for constipation and diarrhea. Endocrine: Negative. Negative for cold intolerance, heat intolerance, polydipsia, polyphagia and polyuria. Genitourinary: Negative. Negative for decreased urine volume, difficulty urinating, dysuria, enuresis, flank pain, frequency, genital sores, hematuria, penile discharge, penile pain, penile swelling, scrotal swelling, testicular pain and urgency. Musculoskeletal: Negative. Negative for arthralgias, back pain, gait problem, joint swelling, myalgias, neck pain and neck stiffness. Skin: Negative. Negative for color change, pallor, rash and wound. Allergic/Immunologic: Negative. Negative for environmental allergies, food allergies and immunocompromised state. Neurological: Negative. Negative for dizziness, tremors, seizures, syncope, facial asymmetry, speech difficulty, weakness, light-headedness, numbness and headaches. Hematological: Negative. Negative for adenopathy. Does not bruise/bleed easily. Psychiatric/Behavioral: Negative. Negative for agitation, behavioral problems, confusion, decreased concentration, dysphoric mood, hallucinations, self-injury, sleep disturbance and suicidal ideas. The patient is not nervous/anxious and is not hyperactive.       Objective:   /80 (Site: Left Upper Arm, Position: Sitting, Cuff Size: Medium Adult)   Pulse 68   Temp 97.3 °F (36.3 °C)   Ht 6' (1.829 m)   Wt 199 lb (90.3 kg)   SpO2 97%   BMI 26.99 kg/m²     Physical Exam  Constitutional: Appearance: He is well-developed. HENT:      Head: Normocephalic and atraumatic. Eyes:      General: No scleral icterus. Left eye: No discharge. Conjunctiva/sclera: Conjunctivae normal.      Pupils: Pupils are equal, round, and reactive to light. Neck:      Thyroid: No thyromegaly. Trachea: No tracheal deviation. Cardiovascular:      Rate and Rhythm: Normal rate and regular rhythm. Heart sounds: Normal heart sounds. No murmur heard. No friction rub. No gallop. Pulmonary:      Effort: Pulmonary effort is normal. No respiratory distress. Breath sounds: Normal breath sounds. No wheezing or rales. Chest:      Chest wall: No tenderness. Abdominal:      General: There is no distension. Palpations: There is no mass. Tenderness: There is no abdominal tenderness. There is no guarding. Hernia: No hernia is present. Musculoskeletal:         General: No tenderness. Normal range of motion. Lymphadenopathy:      Cervical: No cervical adenopathy. Skin:     General: Skin is warm and dry. Coloration: Skin is not pale. Findings: No erythema or rash. Neurological:      Mental Status: He is alert and oriented to person, place, and time. Psychiatric:         Behavior: Behavior normal.         Thought Content: Thought content normal.         Judgment: Judgment normal.          Results for orders placed or performed during the hospital encounter of 05/09/22   Dermatology Pathology   Result Value Ref Range    Dermatology Pathology Report       -- Diagnosis --    Skin right malar groove, biopsy:    -  Nodular basal cell carcinoma, incompletely excised.        Adi Lugo M.D.  **Electronically Signed Out**         rdd/5/11/2022       Clinical Information  Pre-op Diagnosis:  BASAL CELL CARCINOMA    Operative Findings:  RIGHT MALAR GROOVE SKIN BIOPSY     Source of Specimen  A: RIGHT MOLAR GROOVE    Gross Description  \"WILLIAM BAIG, RT MALAR GROOVE\" 0.4 x 0.3 x 0.1 cm crusted tan  shave. Inked and bisected 1cs. tm      Microscopic Description  Biopsy appears incisional and samples a nodular basal cell carcinoma  that extends to margins. SURGICAL PATHOLOGY CONSULTATION       Patient Name: Milla Terrazas: [id number]  Path Number: [id number]    [facility] [address] [place], [address]  [phone]  Fax: [phone]         Assessment:     Patient who has had about a 3-month history of a change in the odor of his flatus. Discussed with patient that from a surgeons perspective it is unlikely that this represents anything of significance. He has no family history of colon cancer and his last colonoscopy 5 years ago showed no polyps but did show diverticula. He is otherwise had no real real change in his bowel habits as he does have irritable bowel syndrome and his bowels do vary from constipation to diarrhea.   We discussed we could perform a colonoscopy but with no other symptoms I do not feel would hold any results that would explain the change in his flatus odor is agreeable to this he will continue to monitor    Plan:     Return to office as needed      Electronicallysigned by Kenia Hale MD on 8/11/2022 at 3:27 PM

## 2023-05-16 ENCOUNTER — HOSPITAL ENCOUNTER (OUTPATIENT)
Age: 68
Discharge: HOME | End: 2023-05-16
Payer: MEDICARE

## 2023-05-16 LAB
ALBUMIN SERPL BCG-MCNC: 4.7 G/DL (ref 3.5–5.1)
ALP SERPL-CCNC: 111 U/L (ref 38–126)
ALT SERPL W/O P-5'-P-CCNC: 18 U/L (ref 11–66)
ANION GAP SERPL CALC-SCNC: 9 MEQ/L (ref 8–16)
AST SERPL-CCNC: 18 U/L (ref 5–40)
BILIRUB SERPL-MCNC: 0.7 MG/DL (ref 0.3–1.2)
BUN SERPL-MCNC: 23 MG/DL (ref 7–22)
CALCIUM SERPL-MCNC: 9.3 MG/DL (ref 8.5–10.5)
CHLORIDE SERPL-SCNC: 105 MEQ/L (ref 98–111)
CHOLEST SERPL-MCNC: 128 MG/DL (ref 100–199)
CO2 SERPL-SCNC: 29 MEQ/L (ref 23–33)
CREAT SERPL-MCNC: 1.4 MG/DL (ref 0.4–1.2)
GFR SERPL CREATININE-BSD FRML MDRD: 54 ML/MIN/1.73M2
GLUCOSE FASTING: 119 MG/DL (ref 70–108)
HDLC SERPL-MCNC: 44 MG/DL
LDLC SERPL CALC-MCNC: 70 MG/DL
POTASSIUM SERPL-SCNC: 4.8 MEQ/L (ref 3.5–5.2)
PROT SERPL-MCNC: 6.9 G/DL (ref 6.1–8)
SODIUM SERPL-SCNC: 143 MEQ/L (ref 135–145)
TRIGL SERPL-MCNC: 69 MG/DL (ref 0–199)

## 2023-05-16 PROCEDURE — 80053 COMPREHEN METABOLIC PANEL: CPT

## 2023-05-16 PROCEDURE — 80061 LIPID PANEL: CPT

## 2023-05-16 PROCEDURE — 36415 COLL VENOUS BLD VENIPUNCTURE: CPT

## 2023-06-15 ENCOUNTER — HOSPITAL ENCOUNTER (EMERGENCY)
Age: 68
Discharge: HOME | End: 2023-06-15
Payer: MEDICARE

## 2023-06-15 VITALS
BODY MASS INDEX: 26.68 KG/M2 | WEIGHT: 197 LBS | DIASTOLIC BLOOD PRESSURE: 77 MMHG | RESPIRATION RATE: 16 BRPM | SYSTOLIC BLOOD PRESSURE: 165 MMHG | HEART RATE: 61 BPM | OXYGEN SATURATION: 97 % | HEIGHT: 72 IN | TEMPERATURE: 98.3 F

## 2023-06-15 DIAGNOSIS — H10.33 ACUTE CONJUNCTIVITIS OF BOTH EYES, UNSPECIFIED ACUTE CONJUNCTIVITIS TYPE: Primary | ICD-10-CM

## 2023-06-15 PROCEDURE — 99213 OFFICE O/P EST LOW 20 MIN: CPT | Performed by: NURSE PRACTITIONER

## 2023-06-15 PROCEDURE — 99213 OFFICE O/P EST LOW 20 MIN: CPT

## 2023-06-15 RX ORDER — GENTAMICIN SULFATE 3 MG/ML
1 SOLUTION/ DROPS OPHTHALMIC 4 TIMES DAILY
Qty: 1 EACH | Refills: 0 | Status: SHIPPED | OUTPATIENT
Start: 2023-06-15 | End: 2023-06-22

## 2023-06-15 ASSESSMENT — PAIN - FUNCTIONAL ASSESSMENT: PAIN_FUNCTIONAL_ASSESSMENT: NONE - DENIES PAIN

## 2023-06-15 ASSESSMENT — ENCOUNTER SYMPTOMS
PHOTOPHOBIA: 0
EYE PAIN: 0
NAUSEA: 0
RHINORRHEA: 0
SHORTNESS OF BREATH: 0
EYE REDNESS: 1
SORE THROAT: 0
COUGH: 0
EYE DISCHARGE: 1
TROUBLE SWALLOWING: 0
DIARRHEA: 0
VOMITING: 0
EYE ITCHING: 1

## 2023-06-15 NOTE — ED PROVIDER NOTES
handwashing. Warm compresses, tear duct massages. If any distress go to ER. PATIENT REFERRED TO:  Elisabet Burroughs MD  [address]  [phone]      Follow up as needed. Medication as prescribed. Visine-A OTC. Warm compress/tear duct massage 2-3x/day. If worse go to ER.     DISCHARGE MEDICATIONS:  Discharge Medication List as of 6/15/2023 12:49 PM        START taking these medications    Details   gentamicin (GARAMYCIN) 0.3 % ophthalmic solution Place 1 drop into both eyes 4 times daily for 7 days, Disp-1 each, R-0Normal           Discharge Medication List as of 6/15/2023 12:49 PM          MARILOU Stein CNP, APRN - CNP  06/15/23 [id number]

## 2023-12-20 ENCOUNTER — HOSPITAL ENCOUNTER (OUTPATIENT)
Age: 68
Discharge: HOME | End: 2023-12-20
Payer: MEDICARE

## 2023-12-20 LAB
ANION GAP SERPL CALC-SCNC: 10 MEQ/L (ref 8–16)
BUN SERPL-MCNC: 27 MG/DL (ref 7–22)
CALCIUM SERPL-MCNC: 9.7 MG/DL (ref 8.5–10.5)
CHLORIDE SERPL-SCNC: 103 MEQ/L (ref 98–111)
CO2 SERPL-SCNC: 29 MEQ/L (ref 23–33)
CREAT SERPL-MCNC: 1.4 MG/DL (ref 0.4–1.2)
GFR SERPL CREATININE-BSD FRML MDRD: 54 ML/MIN/1.73M2
GLUCOSE SERPL-MCNC: 115 MG/DL (ref 70–108)
MAGNESIUM SERPL-MCNC: 2.4 MG/DL (ref 1.6–2.4)
POTASSIUM SERPL-SCNC: 4.6 MEQ/L (ref 3.5–5.2)
SODIUM SERPL-SCNC: 142 MEQ/L (ref 135–145)
TSH SERPL DL<=0.005 MIU/L-ACNC: 1.71 UIU/ML (ref 0.4–4.2)

## 2023-12-20 PROCEDURE — 80048 BASIC METABOLIC PNL TOTAL CA: CPT

## 2023-12-20 PROCEDURE — 84443 ASSAY THYROID STIM HORMONE: CPT

## 2023-12-20 PROCEDURE — 36415 COLL VENOUS BLD VENIPUNCTURE: CPT

## 2023-12-20 PROCEDURE — 83735 ASSAY OF MAGNESIUM: CPT

## 2024-03-21 ENCOUNTER — HOSPITAL ENCOUNTER (EMERGENCY)
Age: 69
Discharge: HOME | End: 2024-03-21
Payer: MEDICARE

## 2024-03-21 VITALS
TEMPERATURE: 98.4 F | DIASTOLIC BLOOD PRESSURE: 74 MMHG | SYSTOLIC BLOOD PRESSURE: 145 MMHG | OXYGEN SATURATION: 98 % | RESPIRATION RATE: 18 BRPM | HEART RATE: 73 BPM

## 2024-03-21 DIAGNOSIS — J01.00 ACUTE NON-RECURRENT MAXILLARY SINUSITIS: Primary | ICD-10-CM

## 2024-03-21 PROCEDURE — 99213 OFFICE O/P EST LOW 20 MIN: CPT | Performed by: NURSE PRACTITIONER

## 2024-03-21 PROCEDURE — 99213 OFFICE O/P EST LOW 20 MIN: CPT

## 2024-03-21 RX ORDER — AMOXICILLIN AND CLAVULANATE POTASSIUM 400; 57 MG/5ML; MG/5ML
10 POWDER, FOR SUSPENSION ORAL 2 TIMES DAILY
Qty: 140 ML | Refills: 0 | Status: SHIPPED | OUTPATIENT
Start: 2024-03-21 | End: 2024-03-28

## 2024-03-21 RX ORDER — PREDNISOLONE 15 MG/5ML
30 SOLUTION ORAL DAILY
Qty: 50 ML | Refills: 0 | Status: SHIPPED | OUTPATIENT
Start: 2024-03-21 | End: 2024-03-26

## 2024-03-21 RX ORDER — PREDNISONE 20 MG/1
40 TABLET ORAL DAILY
Qty: 10 TABLET | Refills: 0 | Status: SHIPPED | OUTPATIENT
Start: 2024-03-21 | End: 2024-03-21

## 2024-03-21 RX ORDER — AMOXICILLIN AND CLAVULANATE POTASSIUM 875; 125 MG/1; MG/1
1 TABLET, FILM COATED ORAL 2 TIMES DAILY
Qty: 14 TABLET | Refills: 0 | Status: SHIPPED | OUTPATIENT
Start: 2024-03-21 | End: 2024-03-21

## 2024-03-21 RX ORDER — ALBUTEROL SULFATE 90 UG/1
2 AEROSOL, METERED RESPIRATORY (INHALATION) EVERY 4 HOURS PRN
Qty: 6.7 G | Refills: 0 | Status: SHIPPED | OUTPATIENT
Start: 2024-03-21

## 2024-03-21 RX ORDER — BENZONATATE 200 MG/1
200 CAPSULE ORAL 3 TIMES DAILY PRN
Qty: 20 CAPSULE | Refills: 0 | Status: SHIPPED | OUTPATIENT
Start: 2024-03-21 | End: 2024-03-28

## 2024-03-21 RX ORDER — ALLOPURINOL 100 MG/1
100 TABLET ORAL DAILY
COMMUNITY

## 2024-03-21 ASSESSMENT — PAIN DESCRIPTION - ONSET: ONSET: ON-GOING

## 2024-03-21 ASSESSMENT — PAIN DESCRIPTION - LOCATION: LOCATION: THROAT

## 2024-03-21 ASSESSMENT — PAIN - FUNCTIONAL ASSESSMENT
PAIN_FUNCTIONAL_ASSESSMENT: ACTIVITIES ARE NOT PREVENTED
PAIN_FUNCTIONAL_ASSESSMENT: 0-10

## 2024-03-21 ASSESSMENT — PAIN DESCRIPTION - FREQUENCY: FREQUENCY: CONTINUOUS

## 2024-03-21 ASSESSMENT — ENCOUNTER SYMPTOMS
SINUS PRESSURE: 1
COUGH: 1
SINUS PAIN: 1
WHEEZING: 1

## 2024-03-21 ASSESSMENT — PAIN DESCRIPTION - DESCRIPTORS: DESCRIPTORS: ACHING

## 2024-03-21 ASSESSMENT — PAIN DESCRIPTION - PAIN TYPE: TYPE: ACUTE PAIN

## 2024-03-21 ASSESSMENT — PAIN SCALES - GENERAL: PAINLEVEL_OUTOF10: 8

## 2024-03-21 NOTE — DISCHARGE INSTRUCTIONS
Medications as prescribed.  Please follow-up with your primary care provider as needed.    Report to the ER with any new or severe symptoms.

## 2024-03-21 NOTE — ED PROVIDER NOTES
[facility]  UrgentCare Encounter      CHIEFCOMPLAINT       Chief Complaint   Patient presents with    Cough    Nasal Congestion       Nurses Notes reviewed and I agree except as noted in the HPI.  HISTORY OF PRESENT ILLNESS   Fernie Marie is a 69 y.o. male who presents to urgent care with complaints of cough and nasal congestion.  Symptom onset was approximately 5 days ago.  He states that he feels like he is getting worse rather than better.  He has had some wheezing as well.  Denies fevers.    REVIEW OF SYSTEMS     Review of Systems   HENT:  Positive for congestion, sinus pressure and sinus pain.    Respiratory:  Positive for cough and wheezing.        PAST MEDICAL HISTORY         Diagnosis Date    Cancer (HCC)     CKD (chronic kidney disease) stage 3, GFR 30-59 ml/min ([facility]) 12/23/2015    GERD (gastroesophageal reflux disease)     Hypertension     IBS (irritable bowel syndrome)        SURGICAL HISTORY     Patient  has a past surgical history that includes cardiovascular stress test (01/01/2011); Arm Surgery (Left, 01/01/1998); Colonoscopy (over 20 years ago); pr esophagogastroduodenoscopy transoral diagnostic (N/A, 07/20/2017); pr colon ca scrn not hi rsk ind (N/A, 07/20/2017); pr colon ca scrn not hi rsk ind (Left, 07/20/2017); and Skin cancer excision (Right, 07/2022).    CURRENT MEDICATIONS       Discharge Medication List as of 3/21/2024  5:09 PM        CONTINUE these medications which have NOT CHANGED    Details   allopurinol (ZYLOPRIM) 100 MG tablet Take 1 tablet by mouth dailyHistorical Med      Bacillus Coagulans-Inulin (PROBIOTIC) 1-250 BILLION-MG CAPS Take by mouthHistorical Med      atorvastatin (LIPITOR) 20 MG tablet Take 1 tablet by mouth dailyHistorical Med      propranolol (INDERAL) 10 MG tablet Take 1 tablet by mouth 2 times dailyHistorical Med      esomeprazole (NEXIUM) 20 MG delayed release capsule Take 1 capsule by mouth every morning (before breakfast)Historical Med       gabapentin (NEURONTIN) 100 MG capsule 3 in am; 3 in afternoon; 2 at bedtime, Disp-240 capsule, R-5      triamcinolone (ARISTOCORT) 0.5 % cream Apply topically 3 times daily, Topical, 3 TIMES DAILY Starting 8/1/2016, Until Discontinued, Disp-1 Tube, R-2, Normal      finasteride (PROSCAR) 5 MG tablet Take 1 tablet by mouth dailyHistorical Med      hydrocortisone valerate (WESTCORT) 0.2 % ointment Apply topically 2 times daily PRN, Disp-1 Tube, R-5, Normal             ALLERGIES     Patient is is allergic to iv contrast [iodides].    FAMILY HISTORY     Patient'sfamily history includes Cancer in his paternal aunt; Coronary Art Dis in his brother; Heart Disease in his mother; Lung Cancer in his brother.    SOCIAL HISTORY     Patient  reports that he quit smoking about 50 years ago. He started smoking about 61 years ago. He has a 33.0 pack-year smoking history. He has never used smokeless tobacco. He reports that he does not drink alcohol and does not use drugs.    PHYSICAL EXAM     ED TRIAGE VITALS  BP: (!) 145/74, Temp: 98.4 °F (36.9 °C), Pulse: 73, Respirations: 18, SpO2: 98 %  Physical Exam  Constitutional:       General: He is not in acute distress.     Appearance: He is well-developed. He is not ill-appearing or toxic-appearing.   HENT:      Head: Normocephalic and atraumatic.      Right Ear: Tympanic membrane normal.      Left Ear: Tympanic membrane normal.      Nose: Congestion and rhinorrhea present.      Right Sinus: Maxillary sinus tenderness present.      Left Sinus: Maxillary sinus tenderness present.      Mouth/Throat:      Mouth: Mucous membranes are moist.      Pharynx: Oropharynx is clear. No pharyngeal swelling, posterior oropharyngeal erythema or uvula swelling.      Tonsils: 0 on the right. 0 on the left.   Eyes:      Conjunctiva/sclera: Conjunctivae normal.      Pupils: Pupils are equal, round, and reactive to light.   Cardiovascular:      Rate and Rhythm: Normal rate and regular rhythm.      Heart

## 2024-03-21 NOTE — ED TRIAGE NOTES
To room with c/o cough and nasal congestin that started Sunday. He returned to work today but has felt that it is \"going to my chest\" so he came to get checked out.

## 2024-06-07 ENCOUNTER — HOSPITAL ENCOUNTER (EMERGENCY)
Age: 69
Discharge: HOME | End: 2024-06-07
Attending: EMERGENCY MEDICINE
Payer: MEDICARE

## 2024-06-07 ENCOUNTER — APPOINTMENT (OUTPATIENT)
Dept: GENERAL RADIOLOGY | Age: 69
End: 2024-06-07
Payer: MEDICARE

## 2024-06-07 VITALS
TEMPERATURE: 98 F | SYSTOLIC BLOOD PRESSURE: 151 MMHG | HEART RATE: 59 BPM | DIASTOLIC BLOOD PRESSURE: 73 MMHG | RESPIRATION RATE: 15 BRPM | BODY MASS INDEX: 26.45 KG/M2 | WEIGHT: 195 LBS | OXYGEN SATURATION: 99 %

## 2024-06-07 DIAGNOSIS — R07.9 CHEST PAIN, UNSPECIFIED TYPE: Primary | ICD-10-CM

## 2024-06-07 LAB
ALBUMIN SERPL BCG-MCNC: 4.3 G/DL (ref 3.5–5.1)
ALP SERPL-CCNC: 97 U/L (ref 38–126)
ALT SERPL W/O P-5'-P-CCNC: 18 U/L (ref 11–66)
ANION GAP SERPL CALC-SCNC: 9 MEQ/L (ref 8–16)
AST SERPL-CCNC: 19 U/L (ref 5–40)
BASOPHILS ABSOLUTE: 0 THOU/MM3 (ref 0–0.1)
BASOPHILS NFR BLD AUTO: 0.7 %
BILIRUB CONJ SERPL-MCNC: < 0.2 MG/DL (ref 0–0.3)
BILIRUB SERPL-MCNC: 0.4 MG/DL (ref 0.3–1.2)
BUN SERPL-MCNC: 24 MG/DL (ref 7–22)
CALCIUM SERPL-MCNC: 9.4 MG/DL (ref 8.5–10.5)
CHLORIDE SERPL-SCNC: 104 MEQ/L (ref 98–111)
CO2 SERPL-SCNC: 27 MEQ/L (ref 23–33)
CREAT SERPL-MCNC: 1.5 MG/DL (ref 0.4–1.2)
DEPRECATED RDW RBC AUTO: 44 FL (ref 35–45)
EKG ATRIAL RATE: 61 BPM
EKG P AXIS: 44 DEGREES
EKG P-R INTERVAL: 144 MS
EKG Q-T INTERVAL: 416 MS
EKG QRS DURATION: 84 MS
EKG QTC CALCULATION (BAZETT): 418 MS
EKG R AXIS: 59 DEGREES
EKG T AXIS: 58 DEGREES
EKG VENTRICULAR RATE: 61 BPM
EOSINOPHIL NFR BLD AUTO: 5.6 %
EOSINOPHILS ABSOLUTE: 0.3 THOU/MM3 (ref 0–0.4)
ERYTHROCYTE [DISTWIDTH] IN BLOOD BY AUTOMATED COUNT: 12.4 % (ref 11.5–14.5)
GFR SERPL CREATININE-BSD FRML MDRD: 49 ML/MIN/1.73M2
GLUCOSE SERPL-MCNC: 102 MG/DL (ref 70–108)
HCT VFR BLD AUTO: 43.8 % (ref 42–52)
HGB BLD-MCNC: 14.8 GM/DL (ref 14–18)
IMM GRANULOCYTES # BLD AUTO: 0.02 THOU/MM3 (ref 0–0.07)
IMM GRANULOCYTES NFR BLD AUTO: 0.3 %
LIPASE SERPL-CCNC: 47.1 U/L (ref 5.6–51.3)
LYMPHOCYTES ABSOLUTE: 2.1 THOU/MM3 (ref 1–4.8)
LYMPHOCYTES NFR BLD AUTO: 36.5 %
MAGNESIUM SERPL-MCNC: 2 MG/DL (ref 1.6–2.4)
MCH RBC QN AUTO: 32.5 PG (ref 26–33)
MCHC RBC AUTO-ENTMCNC: 33.8 GM/DL (ref 32.2–35.5)
MCV RBC AUTO: 96.1 FL (ref 80–94)
MONOCYTES ABSOLUTE: 0.6 THOU/MM3 (ref 0.4–1.3)
MONOCYTES NFR BLD AUTO: 10.9 %
NEUTROPHILS ABSOLUTE: 2.7 THOU/MM3 (ref 1.8–7.7)
NEUTROPHILS NFR BLD AUTO: 46 %
NRBC BLD AUTO-RTO: 0 /100 WBC
NT-PROBNP SERPL IA-MCNC: 151.2 PG/ML (ref 0–124)
OSMOLALITY SERPL CALC.SUM OF ELEC: 283.6 MOSMOL/KG (ref 275–300)
PLATELET # BLD AUTO: 230 THOU/MM3 (ref 130–400)
PMV BLD AUTO: 10.6 FL (ref 9.4–12.4)
POTASSIUM SERPL-SCNC: 4.4 MEQ/L (ref 3.5–5.2)
PROT SERPL-MCNC: 7 G/DL (ref 6.1–8)
RBC # BLD AUTO: 4.56 MILL/MM3 (ref 4.7–6.1)
SODIUM SERPL-SCNC: 140 MEQ/L (ref 135–145)
TROPONIN, HIGH SENSITIVITY: 10 NG/L (ref 0–12)
TROPONIN, HIGH SENSITIVITY: 11 NG/L (ref 0–12)
WBC # BLD AUTO: 5.8 THOU/MM3 (ref 4.8–10.8)

## 2024-06-07 PROCEDURE — 71045 X-RAY EXAM CHEST 1 VIEW: CPT

## 2024-06-07 PROCEDURE — 83735 ASSAY OF MAGNESIUM: CPT

## 2024-06-07 PROCEDURE — 82248 BILIRUBIN DIRECT: CPT

## 2024-06-07 PROCEDURE — 99285 EMERGENCY DEPT VISIT HI MDM: CPT

## 2024-06-07 PROCEDURE — 84484 ASSAY OF TROPONIN QUANT: CPT

## 2024-06-07 PROCEDURE — 80053 COMPREHEN METABOLIC PANEL: CPT

## 2024-06-07 PROCEDURE — 93005 ELECTROCARDIOGRAM TRACING: CPT | Performed by: EMERGENCY MEDICINE

## 2024-06-07 PROCEDURE — 83690 ASSAY OF LIPASE: CPT

## 2024-06-07 PROCEDURE — 83880 ASSAY OF NATRIURETIC PEPTIDE: CPT

## 2024-06-07 PROCEDURE — 36415 COLL VENOUS BLD VENIPUNCTURE: CPT

## 2024-06-07 PROCEDURE — 93010 ELECTROCARDIOGRAM REPORT: CPT | Performed by: NUCLEAR MEDICINE

## 2024-06-07 PROCEDURE — 85025 COMPLETE CBC W/AUTO DIFF WBC: CPT

## 2024-06-07 ASSESSMENT — ENCOUNTER SYMPTOMS
COUGH: 0
BACK PAIN: 0
VOMITING: 0
EYE PAIN: 0
BLOOD IN STOOL: 0
PHOTOPHOBIA: 0
NAUSEA: 0
SHORTNESS OF BREATH: 0
WHEEZING: 0
RHINORRHEA: 0
ABDOMINAL PAIN: 0
SINUS PRESSURE: 0
EYE ITCHING: 0
VOICE CHANGE: 0
EYE DISCHARGE: 0
ABDOMINAL DISTENTION: 0
CONSTIPATION: 0
SORE THROAT: 0
CHOKING: 0
TROUBLE SWALLOWING: 0
EYE REDNESS: 0
CHEST TIGHTNESS: 0
DIARRHEA: 0

## 2024-06-07 ASSESSMENT — HEART SCORE: ECG: NORMAL

## 2024-06-07 ASSESSMENT — PAIN - FUNCTIONAL ASSESSMENT: PAIN_FUNCTIONAL_ASSESSMENT: NONE - DENIES PAIN

## 2024-06-07 NOTE — ED PROVIDER NOTES
[facility]  eMERGENCY dEPARTMENT eNCOUnter          CHIEF COMPLAINT       Chief Complaint   Patient presents with    Chest Pain       Nurses Notes reviewed and I agree except as noted in the HPI.      HISTORY OF PRESENT ILLNESS    Fernie Marie is a 69 y.o. male who presents chest pain.  Patient states that started proxy 2 hours ago in the left upper chest.  Patient states it did not radiate or refer.  Patient's family does have a history of coronary artery disease but no heart attacks before the age of 55.  Patient does not smoke drink or do drugs.  Patient takes hypertensive medication and cluster medication is not diabetic.  Has no personal history of coronary artery disease does see Dr. Stephanie Velez.  Patient states that by the time he got here the pain had resolved on its own.  Patient states has had this multiple times in the past.  Patient states that was more of a burning achy type pain.  Again it did not radiate or refer.  Nothing seems to make it better or worse.  Patient does endorse the fact that he does have significant GERD, he did not take any extra reflux medications when he had this pain.  Patient denies any shortness of breath associated with this.  No abdominal pain or change in bowel or bladder habits.  No syncopal or presyncopal episodes.  Here today patient is resting comfortably on the cot no apparent distress no other physical complaints at this time    REVIEW OF SYSTEMS     Review of Systems   Constitutional:  Negative for activity change, appetite change, diaphoresis, fatigue and unexpected weight change.   HENT:  Negative for congestion, ear discharge, ear pain, hearing loss, rhinorrhea, sinus pressure, sore throat, trouble swallowing and voice change.    Eyes:  Negative for photophobia, pain, discharge, redness and itching.   Respiratory:  Negative for cough, choking, chest tightness, shortness of breath and wheezing.    Cardiovascular:  Positive for chest pain.  Negative for palpitations and leg swelling.   Gastrointestinal:  Negative for abdominal distention, abdominal pain, blood in stool, constipation, diarrhea, nausea and vomiting.   Endocrine: Negative for polydipsia, polyphagia and polyuria.   Genitourinary:  Negative for decreased urine volume, difficulty urinating, dysuria, enuresis, frequency, hematuria, penile pain, penile swelling, scrotal swelling and urgency.   Musculoskeletal:  Negative for arthralgias, back pain, gait problem, myalgias, neck pain and neck stiffness.   Skin:  Negative for pallor and rash.   Allergic/Immunologic: Negative for immunocompromised state.   Neurological:  Negative for dizziness, tremors, seizures, syncope, facial asymmetry, weakness, light-headedness, numbness and headaches.   Hematological:  Negative for adenopathy. Does not bruise/bleed easily.   Psychiatric/Behavioral:  Negative for agitation, hallucinations and suicidal ideas. The patient is not nervous/anxious.           PAST MEDICAL HISTORY    has a past medical history of Cancer (HCC), CKD (chronic kidney disease) stage 3, GFR 30-59 ml/min (HCC), GERD (gastroesophageal reflux disease), Hypertension, and IBS (irritable bowel syndrome).    SURGICAL HISTORY      has a past surgical history that includes cardiovascular stress test (01/01/2011); Arm Surgery (Left, 01/01/1998); Colonoscopy (over 20 years ago); pr esophagogastroduodenoscopy transoral diagnostic (N/A, 07/20/2017); pr colon ca scrn not hi rsk ind (N/A, 07/20/2017); pr colon ca scrn not hi rsk ind (Left, 07/20/2017); and Skin cancer excision (Right, 07/2022).    CURRENT MEDICATIONS       Discharge Medication List as of 6/7/2024  7:20 PM        CONTINUE these medications which have NOT CHANGED    Details   allopurinol (ZYLOPRIM) 100 MG tablet Take 1 tablet by mouth dailyHistorical Med      albuterol sulfate HFA (PROVENTIL;VENTOLIN;PROAIR) 108 (90 Base) MCG/ACT inhaler Inhale 2 puffs into the lungs every 4 hours as

## 2024-06-07 NOTE — ED TRIAGE NOTES
Pt into ED through lobby. He states he began having chest pain over 2 hours ago. He states he has had this before but it ceases on its own. Today it has not. He denies any cardiac history but states his mother and sibling have both had bypass surgeries. He states now coming to the ER the chest pain has ceased.

## 2024-06-07 NOTE — DISCHARGE INSTRUCTIONS
Patient has what appears to be atypical chest pain.  This most likely is GERD however the patient is instructed to follow-up with primary care physician as well as his cardiologist call for an appointment within the next 1 to 2 days.  He is instructed to take all medications as prescribed.  He is instructed return to the nearest emergency room immediately for any new or worsening complaints.

## 2024-09-07 ENCOUNTER — LAB (OUTPATIENT)
Dept: LAB | Age: 69
End: 2024-09-07

## 2024-09-07 LAB
ALBUMIN SERPL BCG-MCNC: 4.5 G/DL (ref 3.5–5.1)
ALP SERPL-CCNC: 108 U/L (ref 38–126)
ALT SERPL W/O P-5'-P-CCNC: 20 U/L (ref 11–66)
ANION GAP SERPL CALC-SCNC: 10 MEQ/L (ref 8–16)
AST SERPL-CCNC: 20 U/L (ref 5–40)
BASOPHILS ABSOLUTE: 0.1 THOU/MM3 (ref 0–0.1)
BASOPHILS NFR BLD AUTO: 1 %
BILIRUB SERPL-MCNC: 0.8 MG/DL (ref 0.3–1.2)
BUN SERPL-MCNC: 22 MG/DL (ref 7–22)
CALCIUM SERPL-MCNC: 9.5 MG/DL (ref 8.5–10.5)
CHLORIDE SERPL-SCNC: 103 MEQ/L (ref 98–111)
CHOLEST SERPL-MCNC: 133 MG/DL (ref 100–199)
CO2 SERPL-SCNC: 27 MEQ/L (ref 23–33)
CREAT SERPL-MCNC: 1.4 MG/DL (ref 0.4–1.2)
DEPRECATED RDW RBC AUTO: 42.7 FL (ref 35–45)
EOSINOPHIL NFR BLD AUTO: 4.6 %
EOSINOPHILS ABSOLUTE: 0.3 THOU/MM3 (ref 0–0.4)
ERYTHROCYTE [DISTWIDTH] IN BLOOD BY AUTOMATED COUNT: 12.3 % (ref 11.5–14.5)
GFR SERPL CREATININE-BSD FRML MDRD: 54 ML/MIN/1.73M2
GLUCOSE SERPL-MCNC: 128 MG/DL (ref 70–108)
HCT VFR BLD AUTO: 46.2 % (ref 42–52)
HDLC SERPL-MCNC: 44 MG/DL
HGB BLD-MCNC: 15.6 GM/DL (ref 14–18)
IMM GRANULOCYTES # BLD AUTO: 0.03 THOU/MM3 (ref 0–0.07)
IMM GRANULOCYTES NFR BLD AUTO: 0.5 %
LDLC SERPL CALC-MCNC: 64 MG/DL
LYMPHOCYTES ABSOLUTE: 1.7 THOU/MM3 (ref 1–4.8)
LYMPHOCYTES NFR BLD AUTO: 28.2 %
MAGNESIUM SERPL-MCNC: 2.2 MG/DL (ref 1.6–2.4)
MCH RBC QN AUTO: 32.3 PG (ref 26–33)
MCHC RBC AUTO-ENTMCNC: 33.8 GM/DL (ref 32.2–35.5)
MCV RBC AUTO: 95.7 FL (ref 80–94)
MONOCYTES ABSOLUTE: 0.6 THOU/MM3 (ref 0.4–1.3)
MONOCYTES NFR BLD AUTO: 10.8 %
NEUTROPHILS ABSOLUTE: 3.2 THOU/MM3 (ref 1.8–7.7)
NEUTROPHILS NFR BLD AUTO: 54.9 %
NRBC BLD AUTO-RTO: 0 /100 WBC
PLATELET # BLD AUTO: 241 THOU/MM3 (ref 130–400)
PMV BLD AUTO: 10.4 FL (ref 9.4–12.4)
POTASSIUM SERPL-SCNC: 4.5 MEQ/L (ref 3.5–5.2)
PROT SERPL-MCNC: 6.8 G/DL (ref 6.1–8)
PSA SERPL-MCNC: 0.47 NG/ML (ref 0–1)
RBC # BLD AUTO: 4.83 MILL/MM3 (ref 4.7–6.1)
SODIUM SERPL-SCNC: 140 MEQ/L (ref 135–145)
TRIGL SERPL-MCNC: 126 MG/DL (ref 0–199)
TSH SERPL DL<=0.005 MIU/L-ACNC: 2.12 UIU/ML (ref 0.4–4.2)
WBC # BLD AUTO: 5.9 THOU/MM3 (ref 4.8–10.8)

## 2025-08-13 ENCOUNTER — HOSPITAL ENCOUNTER (EMERGENCY)
Age: 70
Discharge: HOME | End: 2025-08-13
Payer: MEDICARE

## 2025-08-13 VITALS
BODY MASS INDEX: 27.09 KG/M2 | WEIGHT: 200 LBS | DIASTOLIC BLOOD PRESSURE: 71 MMHG | SYSTOLIC BLOOD PRESSURE: 134 MMHG | OXYGEN SATURATION: 97 % | TEMPERATURE: 97.9 F | HEIGHT: 72 IN | RESPIRATION RATE: 18 BRPM | HEART RATE: 72 BPM

## 2025-08-13 DIAGNOSIS — N41.9 PROSTATITIS, UNSPECIFIED PROSTATITIS TYPE: Primary | ICD-10-CM

## 2025-08-13 LAB
BILIRUB UR STRIP.AUTO-MCNC: NEGATIVE MG/DL
CHARACTER UR: ABNORMAL
COLOR, UA: ABNORMAL
GLUCOSE UR QL STRIP.AUTO: NEGATIVE MG/DL
KETONES UR QL STRIP.AUTO: NEGATIVE
NITRITE UR QL STRIP.AUTO: NEGATIVE
PH UR STRIP.AUTO: 6 [PH] (ref 5–9)
PROT UR STRIP.AUTO-MCNC: NEGATIVE MG/DL
RBC #/AREA URNS HPF: NEGATIVE /[HPF]
SP GR UR STRIP.AUTO: 1.01 (ref 1–1.03)
UROBILINOGEN, URINE: 1 EU/DL (ref 0.2–1)
WBC #/AREA URNS HPF: NEGATIVE /[HPF]

## 2025-08-13 PROCEDURE — 99213 OFFICE O/P EST LOW 20 MIN: CPT

## 2025-08-13 PROCEDURE — 87086 URINE CULTURE/COLONY COUNT: CPT

## 2025-08-13 PROCEDURE — 81003 URINALYSIS AUTO W/O SCOPE: CPT

## 2025-08-13 PROCEDURE — 87077 CULTURE AEROBIC IDENTIFY: CPT

## 2025-08-13 PROCEDURE — 99213 OFFICE O/P EST LOW 20 MIN: CPT | Performed by: NURSE PRACTITIONER

## 2025-08-13 RX ORDER — CIPROFLOXACIN 500 MG/1
500 TABLET, FILM COATED ORAL 2 TIMES DAILY
Qty: 14 TABLET | Refills: 0 | Status: SHIPPED | OUTPATIENT
Start: 2025-08-13 | End: 2025-08-20

## 2025-08-13 ASSESSMENT — PAIN DESCRIPTION - PAIN TYPE: TYPE: ACUTE PAIN

## 2025-08-13 ASSESSMENT — PAIN - FUNCTIONAL ASSESSMENT
PAIN_FUNCTIONAL_ASSESSMENT: ACTIVITIES ARE NOT PREVENTED
PAIN_FUNCTIONAL_ASSESSMENT: 0-10

## 2025-08-13 ASSESSMENT — PAIN SCALES - GENERAL: PAINLEVEL_OUTOF10: 8

## 2025-08-13 ASSESSMENT — PAIN DESCRIPTION - LOCATION: LOCATION: PENIS

## 2025-08-13 ASSESSMENT — PAIN DESCRIPTION - FREQUENCY: FREQUENCY: CONTINUOUS

## 2025-08-13 ASSESSMENT — PAIN DESCRIPTION - DESCRIPTORS: DESCRIPTORS: ACHING

## 2025-08-13 ASSESSMENT — PAIN DESCRIPTION - ONSET: ONSET: GRADUAL

## 2025-08-14 ASSESSMENT — ENCOUNTER SYMPTOMS
SHORTNESS OF BREATH: 0
COUGH: 0
DIARRHEA: 0
ABDOMINAL PAIN: 0
VOMITING: 0
NAUSEA: 0

## 2025-08-15 LAB
BACTERIA UR CULT: ABNORMAL
ORGANISM: ABNORMAL